# Patient Record
Sex: MALE | Race: WHITE | Employment: FULL TIME | URBAN - METROPOLITAN AREA
[De-identification: names, ages, dates, MRNs, and addresses within clinical notes are randomized per-mention and may not be internally consistent; named-entity substitution may affect disease eponyms.]

---

## 2023-08-25 PROBLEM — H90.6 MIXED CONDUCTIVE AND SENSORINEURAL HEARING LOSS OF BOTH EARS: Status: ACTIVE | Noted: 2023-08-25

## 2023-08-25 PROBLEM — M19.90 OSTEOARTHRITIS: Status: ACTIVE | Noted: 2023-08-25

## 2023-08-25 PROBLEM — I44.0 FIRST DEGREE ATRIOVENTRICULAR BLOCK: Status: ACTIVE | Noted: 2023-08-25

## 2023-08-25 PROBLEM — I25.10 ARTERIOSCLEROTIC HEART DISEASE: Status: ACTIVE | Noted: 2023-08-25

## 2023-08-25 PROBLEM — J01.90 ACUTE SINUSITIS: Status: ACTIVE | Noted: 2023-08-25

## 2023-08-25 PROBLEM — H70.11 CHRONIC MASTOIDITIS OF RIGHT SIDE: Status: ACTIVE | Noted: 2023-08-25

## 2023-08-25 PROBLEM — H93.13 BILATERAL TINNITUS: Status: ACTIVE | Noted: 2023-08-25

## 2023-08-25 PROBLEM — H61.22 IMPACTED CERUMEN OF LEFT EAR: Status: ACTIVE | Noted: 2023-08-25

## 2023-08-25 PROBLEM — M19.049 DEGENERATIVE JOINT DISEASE OF HAND: Status: ACTIVE | Noted: 2023-08-25

## 2023-08-25 PROBLEM — Z98.61 CORONARY ANGIOPLASTY STATUS: Status: ACTIVE | Noted: 2023-08-25

## 2023-08-25 PROBLEM — H74.91 DISORDER OF RIGHT MASTOID: Status: ACTIVE | Noted: 2023-08-25

## 2023-08-25 PROBLEM — M47.816 OSTEOARTHRITIS OF LUMBAR SPINE: Status: ACTIVE | Noted: 2023-08-25

## 2023-08-25 PROBLEM — H65.90 NON-SUPPURATIVE OTITIS MEDIA: Status: ACTIVE | Noted: 2023-08-25

## 2023-08-25 PROBLEM — M70.20 OLECRANON BURSITIS: Status: ACTIVE | Noted: 2023-08-25

## 2023-08-25 PROBLEM — H93.8X9 BLOCKED EAR: Status: ACTIVE | Noted: 2023-08-25

## 2023-08-25 PROBLEM — E78.5 HYPERLIPIDEMIA: Status: ACTIVE | Noted: 2023-08-25

## 2023-08-25 RX ORDER — EVOLOCUMAB 140 MG/ML
140 INJECTION, SOLUTION SUBCUTANEOUS
COMMUNITY
Start: 2021-05-20 | End: 2024-04-08

## 2023-08-25 RX ORDER — CEPHALEXIN 500 MG/1
1 CAPSULE ORAL 4 TIMES DAILY
COMMUNITY
End: 2024-04-08

## 2023-08-25 RX ORDER — SERTRALINE HYDROCHLORIDE 50 MG/1
1 TABLET, FILM COATED ORAL DAILY
COMMUNITY

## 2023-08-25 RX ORDER — NAPROXEN SODIUM 220 MG/1
2 TABLET, FILM COATED ORAL DAILY
COMMUNITY

## 2023-08-25 RX ORDER — VITAMIN E (DL,TOCOPHERYL ACET) 90 MG
1 CAPSULE ORAL DAILY
COMMUNITY

## 2023-08-25 RX ORDER — SERTRALINE HYDROCHLORIDE 100 MG/1
TABLET, FILM COATED ORAL
COMMUNITY
Start: 2021-11-05 | End: 2024-04-08

## 2023-08-25 RX ORDER — LOSARTAN POTASSIUM 25 MG/1
1 TABLET ORAL DAILY
COMMUNITY
Start: 2021-03-22

## 2023-08-25 RX ORDER — LANOLIN ALCOHOL/MO/W.PET/CERES
1 CREAM (GRAM) TOPICAL DAILY
COMMUNITY

## 2023-08-25 RX ORDER — RUTIN/HESP/BIOFLAV/C/HERBAL196 40-25-50MG
TABLET ORAL
COMMUNITY
End: 2024-04-08

## 2023-09-21 ENCOUNTER — HOSPITAL ENCOUNTER (OUTPATIENT)
Dept: DATA CONVERSION | Facility: HOSPITAL | Age: 68
Discharge: HOME | End: 2023-09-21
Payer: COMMERCIAL

## 2023-09-21 DIAGNOSIS — I10 ESSENTIAL (PRIMARY) HYPERTENSION: ICD-10-CM

## 2023-09-21 DIAGNOSIS — E78.5 HYPERLIPIDEMIA, UNSPECIFIED: ICD-10-CM

## 2023-09-21 LAB
ALBUMIN SERPL-MCNC: 4 GM/DL (ref 3.5–5)
ALBUMIN/GLOB SERPL: 1.3 RATIO (ref 1.5–3)
ALP BLD-CCNC: 79 U/L (ref 35–125)
ALT SERPL-CCNC: 34 U/L (ref 5–40)
ANION GAP SERPL CALCULATED.3IONS-SCNC: 11 MMOL/L (ref 0–19)
APPEARANCE PLAS: CLEAR
AST SERPL-CCNC: 29 U/L (ref 5–40)
BILIRUB SERPL-MCNC: 0.4 MG/DL (ref 0.1–1.2)
BUN SERPL-MCNC: 19 MG/DL (ref 8–25)
BUN/CREAT SERPL: 19 RATIO (ref 8–21)
CALCIUM SERPL-MCNC: 9.6 MG/DL (ref 8.5–10.4)
CHLORIDE SERPL-SCNC: 107 MMOL/L (ref 97–107)
CHOLEST SERPL-MCNC: 153 MG/DL (ref 133–200)
CHOLEST/HDLC SERPL: 3.6 RATIO
CO2 SERPL-SCNC: 23 MMOL/L (ref 24–31)
COLOR SPUN FLD: YELLOW
CREAT SERPL-MCNC: 1 MG/DL (ref 0.4–1.6)
FASTING STATUS PATIENT QL REPORTED: NORMAL
GFR SERPL CREATININE-BSD FRML MDRD: 81 ML/MIN/1.73 M2
GLOBULIN SER-MCNC: 3 G/DL (ref 1.9–3.7)
GLUCOSE SERPL-MCNC: 106 MG/DL (ref 65–99)
HDLC SERPL-MCNC: 42 MG/DL
LDLC SERPL CALC-MCNC: 90 MG/DL (ref 65–130)
POTASSIUM SERPL-SCNC: 4.3 MMOL/L (ref 3.4–5.1)
PROT SERPL-MCNC: 7 G/DL (ref 5.9–7.9)
SODIUM SERPL-SCNC: 140 MMOL/L (ref 133–145)
TRIGL SERPL-MCNC: 107 MG/DL (ref 40–150)

## 2023-10-23 ENCOUNTER — OFFICE VISIT (OUTPATIENT)
Dept: CARDIOLOGY | Facility: CLINIC | Age: 68
End: 2023-10-23
Payer: COMMERCIAL

## 2023-10-23 VITALS
WEIGHT: 238 LBS | OXYGEN SATURATION: 98 % | DIASTOLIC BLOOD PRESSURE: 78 MMHG | BODY MASS INDEX: 36.19 KG/M2 | HEART RATE: 73 BPM | SYSTOLIC BLOOD PRESSURE: 138 MMHG

## 2023-10-23 DIAGNOSIS — Z98.61 CORONARY ANGIOPLASTY STATUS: ICD-10-CM

## 2023-10-23 DIAGNOSIS — R07.89 OTHER CHEST PAIN: Primary | ICD-10-CM

## 2023-10-23 DIAGNOSIS — E78.00 PURE HYPERCHOLESTEROLEMIA: ICD-10-CM

## 2023-10-23 PROCEDURE — 1160F RVW MEDS BY RX/DR IN RCRD: CPT | Performed by: INTERNAL MEDICINE

## 2023-10-23 PROCEDURE — 1159F MED LIST DOCD IN RCRD: CPT | Performed by: INTERNAL MEDICINE

## 2023-10-23 PROCEDURE — 93005 ELECTROCARDIOGRAM TRACING: CPT | Performed by: INTERNAL MEDICINE

## 2023-10-23 PROCEDURE — 99214 OFFICE O/P EST MOD 30 MIN: CPT | Performed by: INTERNAL MEDICINE

## 2023-10-23 PROCEDURE — 1036F TOBACCO NON-USER: CPT | Performed by: INTERNAL MEDICINE

## 2023-10-23 PROCEDURE — 93010 ELECTROCARDIOGRAM REPORT: CPT | Performed by: INTERNAL MEDICINE

## 2023-10-23 PROCEDURE — 1126F AMNT PAIN NOTED NONE PRSNT: CPT | Performed by: INTERNAL MEDICINE

## 2023-10-23 ASSESSMENT — PATIENT HEALTH QUESTIONNAIRE - PHQ9
SUM OF ALL RESPONSES TO PHQ9 QUESTIONS 1 AND 2: 0
1. LITTLE INTEREST OR PLEASURE IN DOING THINGS: NOT AT ALL
2. FEELING DOWN, DEPRESSED OR HOPELESS: NOT AT ALL

## 2023-10-23 ASSESSMENT — ENCOUNTER SYMPTOMS
DEPRESSION: 0
LOSS OF SENSATION IN FEET: 0
OCCASIONAL FEELINGS OF UNSTEADINESS: 0

## 2023-10-23 ASSESSMENT — PAIN SCALES - GENERAL: PAINLEVEL: 0-NO PAIN

## 2023-10-23 NOTE — PROGRESS NOTES
Subjective      Chief Complaint   Patient presents with    ashd arteriosclerotic heart disease     6 month followup          Is not as active because he will get fatigued easily.  He will get some muscle discomforts in the chest and will feel the heart is pounding.  Will exertion will get a little fatigued.  He clinton us the treadmill but not as often.  He had the stents in 2011 in [facility].  Before the stents he was fatigued and was getting discomfot in the right arm and jaw.  He is not getting it now.   The legs are not swelling and no PND or orthopnea.  The arthritis in the lower back is worse.           ROS     Past Surgical History:   Procedure Laterality Date    OTHER SURGICAL HISTORY  09/15/2022    Knee replacement    OTHER SURGICAL HISTORY  09/14/2022    Mastoidectomy        Active Ambulatory Problems     Diagnosis Date Noted    Osteoarthritis of lumbar spine 08/25/2023    Osteoarthritis 08/25/2023    Olecranon bursitis 08/25/2023    Non-suppurative otitis media 08/25/2023    Mixed conductive and sensorineural hearing loss of both ears 08/25/2023    Hyperlipidemia 08/25/2023    First degree atrioventricular block 08/25/2023    Disorder of right mastoid 08/25/2023    Degenerative joint disease of hand 08/25/2023    Coronary angioplasty status 08/25/2023    Chronic mastoiditis of right side 08/25/2023    Blocked ear 08/25/2023    Bilateral tinnitus 08/25/2023    Impacted cerumen of left ear 08/25/2023    Arteriosclerotic heart disease 08/25/2023    Acute sinusitis 08/25/2023     Resolved Ambulatory Problems     Diagnosis Date Noted    No Resolved Ambulatory Problems     Past Medical History:   Diagnosis Date    Alcohol abuse     Depression     History of PTCA     Prostate cancer (CMS/[facility])         Visit Vitals  /78   Pulse 73   Wt 108 kg (238 lb)   SpO2 98%   BMI 36.19 kg/m²   Smoking Status Former   BSA 2.28 m²        Objective     Constitutional:       Appearance: Healthy appearance.   Eyes:      Pupils:  Pupils are equal, round, and reactive to light.   Neck:      Vascular: JVD normal.   Pulmonary:      Breath sounds: Normal breath sounds.   Cardiovascular:      PMI at left midclavicular line. Normal rate. Regular rhythm. Normal S1. Normal S2.       Murmurs: There is no murmur.      No gallop.  No click. No rub.   Pulses:     Intact distal pulses.   Edema:     Peripheral edema absent.   Abdominal:      Palpations: Abdomen is soft.      Tenderness: There is no abdominal tenderness.   Musculoskeletal:      Extremities: No clubbing present.Skin:     General: Skin is warm and dry.   Neurological:      General: No focal deficit present.            Lab Review:   Lab Results   Component Value Date    CHOL 153 09/21/2023    TRIG 107 09/21/2023    HDL 42 09/21/2023       Assessment/Plan     Coronary angioplasty status  He is more fatigued and has slowed down some.  The EKG shows the first degree AV block and IRBBB.  He did have some trouble with an infection in the lungs earlier in the year.  The symptoms are somewhat similar to before the PTCA and will do a stress test on him    Hyperlipidemia  Is controlled

## 2023-10-23 NOTE — ASSESSMENT & PLAN NOTE
He is more fatigued and has slowed down some.  The EKG shows the first degree AV block and IRBBB.  He did have some trouble with an infection in the lungs earlier in the year.  The symptoms are somewhat similar to before the PTCA and will do a stress test on him

## 2023-10-27 ENCOUNTER — HOSPITAL ENCOUNTER (OUTPATIENT)
Dept: CARDIOLOGY | Facility: HOSPITAL | Age: 68
Discharge: HOME | End: 2023-10-27
Payer: COMMERCIAL

## 2023-10-27 DIAGNOSIS — Z98.61 CORONARY ANGIOPLASTY STATUS: ICD-10-CM

## 2023-10-27 PROCEDURE — 93017 CV STRESS TEST TRACING ONLY: CPT

## 2023-10-27 PROCEDURE — 93018 CV STRESS TEST I&R ONLY: CPT | Performed by: INTERNAL MEDICINE

## 2023-10-27 PROCEDURE — 93016 CV STRESS TEST SUPVJ ONLY: CPT | Performed by: INTERNAL MEDICINE

## 2023-11-08 ENCOUNTER — TELEPHONE (OUTPATIENT)
Dept: CARDIOLOGY | Facility: CLINIC | Age: 68
End: 2023-11-08
Payer: COMMERCIAL

## 2023-11-14 ENCOUNTER — ANCILLARY PROCEDURE (OUTPATIENT)
Dept: RADIOLOGY | Facility: CLINIC | Age: 68
End: 2023-11-14
Payer: COMMERCIAL

## 2023-11-14 DIAGNOSIS — M54.50 LOW BACK PAIN, UNSPECIFIED: ICD-10-CM

## 2023-11-14 DIAGNOSIS — R06.09 OTHER FORMS OF DYSPNEA: ICD-10-CM

## 2023-11-14 PROCEDURE — 72110 X-RAY EXAM L-2 SPINE 4/>VWS: CPT | Mod: FY

## 2023-11-14 PROCEDURE — 71046 X-RAY EXAM CHEST 2 VIEWS: CPT

## 2024-04-07 NOTE — PROGRESS NOTES
Subjective      Chief Complaint   Patient presents with    6 month follow up          Last seen he was having some shortness of breath with exertion was question of this may be similar symptoms he had with his angioplasty.  Underwent a stress test and he was able to stress into stage IV Nigel protocol for 10 minutes with a heart rate of 129.  And no ischemic changes  Is doing well and is active.  He is not complaining of chest discomfort.  NO PND or orthopnea.  The legs are not swelling on him.  He does not complain of palpitations.  He is on a new pain med for his back  HE is on the repatha and is doing well  Is on the treadmill and has lost some wt.           Review of Systems   Constitutional: Negative. Negative for chills and fever.   HENT: Negative.     Eyes: Negative.    Respiratory: Negative.  Negative for cough.    Endocrine: Negative.    Skin: Negative.    Musculoskeletal:  Positive for joint pain. Negative for falls.   Gastrointestinal: Negative.    Genitourinary: Negative.    Neurological: Negative.    All other systems reviewed and are negative.       Past Surgical History:   Procedure Laterality Date    CORONARY ANGIOPLASTY      OTHER SURGICAL HISTORY  09/15/2022    Knee replacement    OTHER SURGICAL HISTORY  09/14/2022    Mastoidectomy        Active Ambulatory Problems     Diagnosis Date Noted    Osteoarthritis of lumbar spine 08/25/2023    Osteoarthritis 08/25/2023    Olecranon bursitis 08/25/2023    Non-suppurative otitis media 08/25/2023    Mixed conductive and sensorineural hearing loss of both ears 08/25/2023    Hyperlipidemia 08/25/2023    First degree atrioventricular block 08/25/2023    Disorder of right mastoid 08/25/2023    Degenerative joint disease of hand 08/25/2023    Coronary angioplasty status 08/25/2023    Chronic mastoiditis of right side 08/25/2023    Blocked ear 08/25/2023    Bilateral tinnitus 08/25/2023    Impacted cerumen of left ear 08/25/2023    Arteriosclerotic heart disease  "08/25/2023    Acute sinusitis 08/25/2023     Resolved Ambulatory Problems     Diagnosis Date Noted    No Resolved Ambulatory Problems     Past Medical History:   Diagnosis Date    Alcohol abuse     Depression     History of PTCA     Prostate cancer (CMS/[facility])         Visit Vitals  /81   Pulse 57   Wt 103 kg (226 lb)   SpO2 95%   BMI 34.36 kg/m²   Smoking Status Former   BSA 2.22 m²        Objective     Constitutional:       Appearance: Healthy appearance.   Eyes:      Pupils: Pupils are equal, round, and reactive to light.   Neck:      Vascular: JVD normal.   Pulmonary:      Breath sounds: Normal breath sounds.   Cardiovascular:      PMI at left midclavicular line. Normal rate. Regular rhythm. Normal S1. Normal S2.       Murmurs: There is no murmur.      No gallop.  No click. No rub.   Pulses:     Intact distal pulses.   Edema:     Peripheral edema absent.   Abdominal:      Palpations: Abdomen is soft.      Tenderness: There is no abdominal tenderness.   Musculoskeletal:      Extremities: No clubbing present.Skin:     General: Skin is warm and dry.   Neurological:      General: No focal deficit present.            Lab Review:         Lab Results   Component Value Date    CHOL 153 09/21/2023    CHOL 148 10/14/2021     Lab Results   Component Value Date    HDL 42 09/21/2023    HDL 41 10/14/2021     Lab Results   Component Value Date    LDLCALC 90 09/21/2023    LDLCALC 84 10/14/2021     Lab Results   Component Value Date    TRIG 107 09/21/2023    TRIG 117 10/14/2021     No components found for: \"CHOLHDL\"     Assessment/Plan     Arteriosclerotic heart disease  Is doing well and is active.  No angina and no chf.  The back hurts and will have PT. The stress was negative last time and neg at 10 mets.    Hyperlipidemia  Is well controlled on repatha     "

## 2024-04-08 ENCOUNTER — OFFICE VISIT (OUTPATIENT)
Dept: CARDIOLOGY | Facility: CLINIC | Age: 69
End: 2024-04-08
Payer: COMMERCIAL

## 2024-04-08 VITALS
OXYGEN SATURATION: 95 % | WEIGHT: 226 LBS | DIASTOLIC BLOOD PRESSURE: 81 MMHG | HEART RATE: 57 BPM | BODY MASS INDEX: 34.36 KG/M2 | SYSTOLIC BLOOD PRESSURE: 128 MMHG

## 2024-04-08 DIAGNOSIS — I25.10 ARTERIOSCLEROTIC HEART DISEASE: ICD-10-CM

## 2024-04-08 DIAGNOSIS — E78.00 PURE HYPERCHOLESTEROLEMIA: Primary | ICD-10-CM

## 2024-04-08 PROCEDURE — 1036F TOBACCO NON-USER: CPT | Performed by: INTERNAL MEDICINE

## 2024-04-08 PROCEDURE — 1159F MED LIST DOCD IN RCRD: CPT | Performed by: INTERNAL MEDICINE

## 2024-04-08 PROCEDURE — 1126F AMNT PAIN NOTED NONE PRSNT: CPT | Performed by: INTERNAL MEDICINE

## 2024-04-08 PROCEDURE — 99213 OFFICE O/P EST LOW 20 MIN: CPT | Performed by: INTERNAL MEDICINE

## 2024-04-08 PROCEDURE — 1160F RVW MEDS BY RX/DR IN RCRD: CPT | Performed by: INTERNAL MEDICINE

## 2024-04-08 RX ORDER — DICLOFENAC SODIUM 100 MG/1
100 TABLET, EXTENDED RELEASE ORAL DAILY
COMMUNITY
Start: 2023-12-06

## 2024-04-08 RX ORDER — FLUTICASONE FUROATE, UMECLIDINIUM BROMIDE AND VILANTEROL TRIFENATATE 200; 62.5; 25 UG/1; UG/1; UG/1
1 POWDER RESPIRATORY (INHALATION)
COMMUNITY
Start: 2023-11-27

## 2024-04-08 RX ORDER — EVOLOCUMAB 140 MG/ML
140 INJECTION, SOLUTION SUBCUTANEOUS
Qty: 2 ML | Refills: 11 | Status: SHIPPED | OUTPATIENT
Start: 2024-04-08 | End: 2025-04-08

## 2024-04-08 ASSESSMENT — PATIENT HEALTH QUESTIONNAIRE - PHQ9
1. LITTLE INTEREST OR PLEASURE IN DOING THINGS: NOT AT ALL
SUM OF ALL RESPONSES TO PHQ9 QUESTIONS 1 AND 2: 0
2. FEELING DOWN, DEPRESSED OR HOPELESS: NOT AT ALL

## 2024-04-08 ASSESSMENT — ENCOUNTER SYMPTOMS
OCCASIONAL FEELINGS OF UNSTEADINESS: 0
CHILLS: 0
DEPRESSION: 0
CONSTITUTIONAL NEGATIVE: 1
RESPIRATORY NEGATIVE: 1
GASTROINTESTINAL NEGATIVE: 1
ENDOCRINE NEGATIVE: 1
NEUROLOGICAL NEGATIVE: 1
FEVER: 0
EYES NEGATIVE: 1
LOSS OF SENSATION IN FEET: 0
FALLS: 0
COUGH: 0

## 2024-04-08 ASSESSMENT — PAIN SCALES - GENERAL: PAINLEVEL: 0-NO PAIN

## 2024-04-08 NOTE — ASSESSMENT & PLAN NOTE
Is doing well and is active.  No angina and no chf.  The back hurts and will have PT. The stress was negative last time and neg at 10 mets.

## 2024-09-10 DIAGNOSIS — I25.10 ARTERIOSCLEROTIC HEART DISEASE: Primary | ICD-10-CM

## 2024-09-12 RX ORDER — LOSARTAN POTASSIUM 25 MG/1
25 TABLET ORAL DAILY
Qty: 90 TABLET | Refills: 4 | OUTPATIENT
Start: 2024-09-12

## 2024-09-18 RX ORDER — LOSARTAN POTASSIUM 25 MG/1
25 TABLET ORAL DAILY
Qty: 90 TABLET | Refills: 3 | Status: SHIPPED | OUTPATIENT
Start: 2024-09-18

## 2024-10-14 ENCOUNTER — APPOINTMENT (OUTPATIENT)
Dept: CARDIOLOGY | Facility: CLINIC | Age: 69
End: 2024-10-14
Payer: COMMERCIAL

## 2024-10-22 NOTE — PROGRESS NOTES
Subjective      Chief Complaint   Patient presents with    Follow-up          Last seen he was having some shortness of breath with exertion was question of this may be similar symptoms he had with his angioplasty.  Underwent a stress test and he was able to stress into stage IV Nigel protocol for 10 minutes with a heart rate of 129.  And no ischemic changes  He is active.  He is not complaining of chest discomfort.  NO PND or orthopnea.  The legs are not swelling on him.  He does not complain of palpitations.  The BP is doing well  The chol has been good           ROS     Past Surgical History:   Procedure Laterality Date    CORONARY ANGIOPLASTY      OTHER SURGICAL HISTORY  09/15/2022    Knee replacement    OTHER SURGICAL HISTORY  09/14/2022    Mastoidectomy        Active Ambulatory Problems     Diagnosis Date Noted    Osteoarthritis of lumbar spine 08/25/2023    Osteoarthritis 08/25/2023    Olecranon bursitis 08/25/2023    Non-suppurative otitis media 08/25/2023    Mixed conductive and sensorineural hearing loss of both ears 08/25/2023    Hyperlipidemia 08/25/2023    First degree atrioventricular block 08/25/2023    Disorder of right mastoid 08/25/2023    Degenerative joint disease of hand 08/25/2023    Coronary angioplasty status 08/25/2023    Chronic mastoiditis of right side 08/25/2023    Blocked ear 08/25/2023    Bilateral tinnitus 08/25/2023    Impacted cerumen of left ear 08/25/2023    Arteriosclerotic heart disease 08/25/2023    Acute sinusitis 08/25/2023     Resolved Ambulatory Problems     Diagnosis Date Noted    No Resolved Ambulatory Problems     Past Medical History:   Diagnosis Date    Alcohol abuse     Depression     History of PTCA     Prostate cancer ([facility])         Visit Vitals  /82   Pulse 60   Wt 101 kg (223 lb)   SpO2 98%   BMI 33.91 kg/m²   Smoking Status Former   BSA 2.2 m²        Objective     Constitutional:       Appearance: Healthy appearance.   Eyes:      Pupils: Pupils are equal,  "round, and reactive to light.   Neck:      Vascular: No JVR. JVD normal.   Pulmonary:      Effort: Pulmonary effort is normal.      Breath sounds: Normal breath sounds.   Cardiovascular:      PMI at left midclavicular line. Normal rate. Regular rhythm. Normal S1. Normal S2.       Murmurs: There is no murmur.      No gallop.  No click. No rub.   Pulses:     Intact distal pulses.   Edema:     Peripheral edema absent.   Abdominal:      Palpations: Abdomen is soft.      Tenderness: There is no abdominal tenderness.   Musculoskeletal: Normal range of motion.      Extremities: No clubbing present.Skin:     General: Skin is warm and dry.   Neurological:      General: No focal deficit present.            Lab Review:         Lab Results   Component Value Date    CHOL 153 09/21/2023    CHOL 148 10/14/2021     Lab Results   Component Value Date    HDL 42 09/21/2023    HDL 41 10/14/2021     Lab Results   Component Value Date    LDLCALC 90 09/21/2023    LDLCALC 84 10/14/2021     Lab Results   Component Value Date    TRIG 107 09/21/2023    TRIG 117 10/14/2021     No components found for: \"CHOLHDL\"     Assessment/Plan     Arteriosclerotic heart disease  Is doing well and no angina and no chf.  He remains active    Hyperlipidemia  Has been well maintained     "

## 2024-10-23 ENCOUNTER — OFFICE VISIT (OUTPATIENT)
Dept: CARDIOLOGY | Facility: CLINIC | Age: 69
End: 2024-10-23
Payer: COMMERCIAL

## 2024-10-23 VITALS
SYSTOLIC BLOOD PRESSURE: 140 MMHG | OXYGEN SATURATION: 98 % | HEART RATE: 60 BPM | BODY MASS INDEX: 33.91 KG/M2 | WEIGHT: 223 LBS | DIASTOLIC BLOOD PRESSURE: 82 MMHG

## 2024-10-23 DIAGNOSIS — E78.00 PURE HYPERCHOLESTEROLEMIA: ICD-10-CM

## 2024-10-23 DIAGNOSIS — I25.10 ARTERIOSCLEROTIC HEART DISEASE: Primary | ICD-10-CM

## 2024-10-23 PROCEDURE — 99213 OFFICE O/P EST LOW 20 MIN: CPT | Performed by: INTERNAL MEDICINE

## 2024-10-23 PROCEDURE — 1126F AMNT PAIN NOTED NONE PRSNT: CPT | Performed by: INTERNAL MEDICINE

## 2024-10-23 PROCEDURE — 1036F TOBACCO NON-USER: CPT | Performed by: INTERNAL MEDICINE

## 2024-10-23 ASSESSMENT — ENCOUNTER SYMPTOMS
OCCASIONAL FEELINGS OF UNSTEADINESS: 0
DEPRESSION: 0
LOSS OF SENSATION IN FEET: 0

## 2024-10-23 ASSESSMENT — PAIN SCALES - GENERAL: PAINLEVEL_OUTOF10: 0-NO PAIN

## 2024-11-13 ENCOUNTER — TELEPHONE (OUTPATIENT)
Dept: CARDIOLOGY | Facility: CLINIC | Age: 69
End: 2024-11-13
Payer: COMMERCIAL

## 2024-12-23 ENCOUNTER — HOSPITAL ENCOUNTER (OUTPATIENT)
Dept: RADIOLOGY | Facility: HOSPITAL | Age: 69
Discharge: HOME | End: 2024-12-23
Payer: COMMERCIAL

## 2024-12-23 DIAGNOSIS — R52 PAIN: ICD-10-CM

## 2024-12-23 DIAGNOSIS — M19.011 PRIMARY OSTEOARTHRITIS, RIGHT SHOULDER: ICD-10-CM

## 2024-12-23 PROCEDURE — 76376 3D RENDER W/INTRP POSTPROCES: CPT | Mod: RIGHT SIDE | Performed by: RADIOLOGY

## 2024-12-23 PROCEDURE — 73200 CT UPPER EXTREMITY W/O DYE: CPT | Mod: RIGHT SIDE | Performed by: RADIOLOGY

## 2024-12-23 PROCEDURE — 76377 3D RENDER W/INTRP POSTPROCES: CPT

## 2024-12-23 PROCEDURE — 73200 CT UPPER EXTREMITY W/O DYE: CPT | Mod: RT

## 2025-02-02 NOTE — PROGRESS NOTES
Subjective      Chief Complaint   Patient presents with    S/C 02/20/2025 Dr. Cruz  RIGHT REVERSE TOTAL SHOULDER            He is to have the right shoulder replaced.  He is not complaining of chest discomfort.  NO PND or orthopnea.  The legs are not swelling on him.  He does not complain of palpitations. He had the stress test 10/2023 and was negative at 10METS.  He has not trouble with walking staires  The EKG shows NSR with NSTTW changes unchanged from 2023           Review of Systems   Constitutional: Negative. Negative for chills.   HENT: Negative.     Eyes: Negative.    Respiratory: Negative.  Negative for cough and shortness of breath.    Endocrine: Negative.    Skin: Negative.    Musculoskeletal: Negative.  Negative for falls.   Gastrointestinal: Negative.    Genitourinary: Negative.    Neurological: Negative.    All other systems reviewed and are negative.       Past Surgical History:   Procedure Laterality Date    CORONARY ANGIOPLASTY      OTHER SURGICAL HISTORY  09/15/2022    Knee replacement    OTHER SURGICAL HISTORY  09/14/2022    Mastoidectomy        Active Ambulatory Problems     Diagnosis Date Noted    Osteoarthritis of lumbar spine 08/25/2023    Osteoarthritis 08/25/2023    Olecranon bursitis 08/25/2023    Non-suppurative otitis media 08/25/2023    Mixed conductive and sensorineural hearing loss of both ears 08/25/2023    Hyperlipidemia 08/25/2023    First degree atrioventricular block 08/25/2023    Disorder of right mastoid 08/25/2023    Degenerative joint disease of hand 08/25/2023    Coronary angioplasty status 08/25/2023    Chronic mastoiditis of right side 08/25/2023    Blocked ear 08/25/2023    Bilateral tinnitus 08/25/2023    Impacted cerumen of left ear 08/25/2023    Arteriosclerotic heart disease 08/25/2023    Acute sinusitis 08/25/2023    Preop cardiovascular exam 02/03/2025     Resolved Ambulatory Problems     Diagnosis Date Noted    No Resolved Ambulatory Problems     Past Medical  "History:   Diagnosis Date    Alcohol abuse     Depression     History of PTCA     Prostate cancer (Multi)         Visit Vitals  /70   Pulse 65   Wt 102 kg (224 lb)   SpO2 96%   BMI 34.06 kg/m²   Smoking Status Former   BSA 2.21 m²        Objective     Constitutional:       Appearance: Healthy appearance.   Eyes:      Pupils: Pupils are equal, round, and reactive to light.   Neck:      Vascular: No JVR. JVD normal.   Pulmonary:      Effort: Pulmonary effort is normal.      Breath sounds: Normal breath sounds.   Cardiovascular:      PMI at left midclavicular line. Normal rate. Regular rhythm. Normal S1. Normal S2.       Murmurs: There is no murmur.      No gallop.  No click. No rub.   Pulses:     Intact distal pulses.   Edema:     Peripheral edema absent.   Abdominal:      Palpations: Abdomen is soft.      Tenderness: There is no abdominal tenderness.   Musculoskeletal: Normal range of motion.      Extremities: No clubbing present.Skin:     General: Skin is warm and dry.   Neurological:      General: No focal deficit present.            Lab Review:         Lab Results   Component Value Date    CHOL 153 09/21/2023    CHOL 148 10/14/2021     Lab Results   Component Value Date    HDL 42 09/21/2023    HDL 41 10/14/2021     Lab Results   Component Value Date    LDLCALC 90 09/21/2023    LDLCALC 84 10/14/2021     Lab Results   Component Value Date    TRIG 107 09/21/2023    TRIG 117 10/14/2021     No components found for: \"CHOLHDL\"     Assessment/Plan     Preop cardiovascular exam  He is doing well and no angina and no chf.    He is to have the right shoulder surgery and feel is low risk and will clear for surgery.     "

## 2025-02-03 ENCOUNTER — OFFICE VISIT (OUTPATIENT)
Dept: CARDIOLOGY | Facility: CLINIC | Age: 70
End: 2025-02-03
Payer: COMMERCIAL

## 2025-02-03 ENCOUNTER — PRE-ADMISSION TESTING (OUTPATIENT)
Dept: PREADMISSION TESTING | Facility: HOSPITAL | Age: 70
End: 2025-02-03
Payer: COMMERCIAL

## 2025-02-03 VITALS
DIASTOLIC BLOOD PRESSURE: 70 MMHG | OXYGEN SATURATION: 96 % | HEART RATE: 65 BPM | BODY MASS INDEX: 34.06 KG/M2 | WEIGHT: 224 LBS | SYSTOLIC BLOOD PRESSURE: 120 MMHG

## 2025-02-03 VITALS
WEIGHT: 224.1 LBS | HEIGHT: 68 IN | TEMPERATURE: 98.6 F | SYSTOLIC BLOOD PRESSURE: 142 MMHG | OXYGEN SATURATION: 97 % | HEART RATE: 60 BPM | DIASTOLIC BLOOD PRESSURE: 75 MMHG | BODY MASS INDEX: 33.96 KG/M2

## 2025-02-03 DIAGNOSIS — Z01.818 PREOPERATIVE TESTING: Primary | ICD-10-CM

## 2025-02-03 DIAGNOSIS — I25.10 ARTERIOSCLEROTIC HEART DISEASE: Primary | ICD-10-CM

## 2025-02-03 DIAGNOSIS — Z01.810 PREOP CARDIOVASCULAR EXAM: ICD-10-CM

## 2025-02-03 PROCEDURE — 87081 CULTURE SCREEN ONLY: CPT | Mod: WESLAB

## 2025-02-03 PROCEDURE — 93010 ELECTROCARDIOGRAM REPORT: CPT | Performed by: INTERNAL MEDICINE

## 2025-02-03 PROCEDURE — 1159F MED LIST DOCD IN RCRD: CPT | Performed by: INTERNAL MEDICINE

## 2025-02-03 PROCEDURE — 1126F AMNT PAIN NOTED NONE PRSNT: CPT | Performed by: INTERNAL MEDICINE

## 2025-02-03 PROCEDURE — 1036F TOBACCO NON-USER: CPT | Performed by: INTERNAL MEDICINE

## 2025-02-03 PROCEDURE — 99213 OFFICE O/P EST LOW 20 MIN: CPT | Performed by: INTERNAL MEDICINE

## 2025-02-03 PROCEDURE — 99204 OFFICE O/P NEW MOD 45 MIN: CPT | Performed by: NURSE PRACTITIONER

## 2025-02-03 PROCEDURE — 93005 ELECTROCARDIOGRAM TRACING: CPT | Performed by: INTERNAL MEDICINE

## 2025-02-03 PROCEDURE — 99213 OFFICE O/P EST LOW 20 MIN: CPT | Mod: 25 | Performed by: INTERNAL MEDICINE

## 2025-02-03 RX ORDER — ACETAMINOPHEN 500 MG
1000 TABLET ORAL EVERY 6 HOURS PRN
COMMUNITY

## 2025-02-03 RX ORDER — CHLORHEXIDINE GLUCONATE ORAL RINSE 1.2 MG/ML
SOLUTION DENTAL
Qty: 473 ML | Refills: 0 | Status: SHIPPED | OUTPATIENT
Start: 2025-02-03

## 2025-02-03 ASSESSMENT — DUKE ACTIVITY SCORE INDEX (DASI)
CAN YOU TAKE CARE OF YOURSELF (EAT, DRESS, BATHE, OR USE TOILET): YES
CAN YOU DO MODERATE WORK AROUND THE HOUSE LIKE VACUUMING, SWEEPING FLOORS OR CARRYING GROCERIES: YES
CAN YOU DO HEAVY WORK AROUND THE HOUSE LIKE SCRUBBING FLOORS OR LIFTING AND MOVING HEAVY FURNITURE: YES
CAN YOU RUN A SHORT DISTANCE: YES
CAN YOU CLIMB A FLIGHT OF STAIRS OR WALK UP A HILL: YES
TOTAL_SCORE: 58.2
CAN YOU PARTICIPATE IN STRENOUS SPORTS LIKE SWIMMING, SINGLES TENNIS, FOOTBALL, BASKETBALL, OR SKIING: YES
CAN YOU PARTICIPATE IN MODERATE RECREATIONAL ACTIVITIES LIKE GOLF, BOWLING, DANCING, DOUBLES TENNIS OR THROWING A BASEBALL OR FOOTBALL: YES
CAN YOU DO LIGHT WORK AROUND THE HOUSE LIKE DUSTING OR WASHING DISHES: YES
CAN YOU HAVE SEXUAL RELATIONS: YES
CAN YOU DO YARD WORK LIKE RAKING LEAVES, WEEDING OR PUSHING A MOWER: YES
DASI METS SCORE: 9.9
CAN YOU WALK INDOORS, SUCH AS AROUND YOUR HOUSE: YES
CAN YOU WALK A BLOCK OR TWO ON LEVEL GROUND: YES

## 2025-02-03 ASSESSMENT — ENCOUNTER SYMPTOMS
NEUROLOGICAL NEGATIVE: 1
SHORTNESS OF BREATH: 0
GASTROINTESTINAL NEGATIVE: 1
CONSTITUTIONAL NEGATIVE: 1
EYES NEGATIVE: 1
ARTHRALGIAS: 1
MUSCULOSKELETAL NEGATIVE: 1
GASTROINTESTINAL NEGATIVE: 1
NEUROLOGICAL NEGATIVE: 1
RESPIRATORY NEGATIVE: 1
EYES NEGATIVE: 1
ACTIVITY CHANGE: 1
PSYCHIATRIC NEGATIVE: 1
RESPIRATORY NEGATIVE: 1
LOSS OF SENSATION IN FEET: 0
DEPRESSION: 0
COUGH: 0
CARDIOVASCULAR NEGATIVE: 1
ENDOCRINE NEGATIVE: 1
CHILLS: 0
OCCASIONAL FEELINGS OF UNSTEADINESS: 0
FALLS: 0

## 2025-02-03 ASSESSMENT — PATIENT HEALTH QUESTIONNAIRE - PHQ9
1. LITTLE INTEREST OR PLEASURE IN DOING THINGS: NOT AT ALL
2. FEELING DOWN, DEPRESSED OR HOPELESS: NOT AT ALL
SUM OF ALL RESPONSES TO PHQ9 QUESTIONS 1 AND 2: 0

## 2025-02-03 ASSESSMENT — LIFESTYLE VARIABLES: TOTAL SCORE: 0

## 2025-02-03 ASSESSMENT — PAIN SCALES - GENERAL
PAINLEVEL_OUTOF10: 0-NO PAIN
PAINLEVEL_OUTOF10: 0 - NO PAIN

## 2025-02-03 ASSESSMENT — PAIN - FUNCTIONAL ASSESSMENT: PAIN_FUNCTIONAL_ASSESSMENT: 0-10

## 2025-02-03 NOTE — PREPROCEDURE INSTRUCTIONS
Medication List            Accurate as of February 3, 2025  2:49 PM. Always use your most recent med list.                acetaminophen 500 mg tablet  Commonly known as: Tylenol  Medication Adjustments for Surgery: Take/Use as prescribed     aspirin 81 mg chewable tablet  Additional Medication Adjustments for Surgery: Take last dose 7 days before surgery     losartan 25 mg tablet  Commonly known as: Cozaar  Take 1 tablet (25 mg) by mouth once daily.  Medication Adjustments for Surgery: Do Not take on the morning of surgery     MULTI VITAMIN ORAL  Additional Medication Adjustments for Surgery: Take last dose 7 days before surgery     Repatha SureClick 140 mg/mL injection  Generic drug: evolocumab  Inject 1 mL (140 mg) under the skin every 14 (fourteen) days.     Trelegy Ellipta 200-62.5-25 mcg blister with device  Generic drug: fluticasone-umeclidin-vilanter  Medication Adjustments for Surgery: Take on the morning of surgery     Vitamin B-12 1,000 mcg tablet  Generic drug: cyanocobalamin  Additional Medication Adjustments for Surgery: Take last dose 7 days before surgery     Zoloft 50 mg tablet  Generic drug: sertraline  Medication Adjustments for Surgery: Take on the morning of surgery                    Preoperative Fasting Guidelines    Why must I stop eating and drinking near surgery time?  With sedation, food or liquid in your stomach can enter your lungs causing serious complications  Increases nausea and vomiting    When do I need to stop eating and drinking before my surgery?  Do not eat any food after midnight the night before your surgery/procedure.  You may have up to 13.5 ounces of clear liquid until TWO hours before your instructed arrival time to the hospital.  This includes water, black tea/coffee, (no milk or cream) apple juice, and electrolyte drinks (Gatorade)  You may chew gum until TWO hours before your surgery/procedure    PAT DISCHARGE INSTRUCTIONS    Please call the Same Day Surgery (SDS)  Department of the hospital where your procedure will be performed after 2:00 PM the day before your surgery. If you are scheduled on a Monday, or a Tuesday following a Monday holiday, you will need to call on the last business day prior to your surgery.    [facility]  [address] [place] [address] [phone]  [facility]  [address], [address] [phone]  [facility]  [address]  [place], [place] [id number] [phone]    Please let your surgeon know if:      You develop any open sores, shingles, burning or painful urination as these may increase your risk of an infection.   You no longer wish to have the surgery.   Any other personal circumstances change that may lead to the need to cancel or defer this surgery-such as being sick or getting admitted to any hospital within one week of your planned procedure.    Your contact details change, such as a change of address or phone number.    Starting now:     Please DO NOT drink alcohol or smoke for 24 hours before surgery. It is well known that quitting smoking can make a huge difference to your health and recovery from surgery. The longer you abstain from smoking, the better your chances of a healthy recovery. If you need help with quitting, call 7-800-QUIT-NOW to be connected to a trained counselor who will discuss the best methods to help you quit.     Before your surgery:    Please stop all supplements 7 days prior to surgery. Or as directed by your surgeon.   Please stop taking NSAID pain medicine such as Advil and Motrin 7 days before surgery.    If you develop any fever, cough, cold, rashes, cuts, scratches, scrapes, urinary symptoms or infection anywhere on your body (including teeth and gums) prior to surgery, please call your surgeon’s office as soon as possible. This may require treatment  to reduce the chance of cancellation on the day of surgery.    The day before your surgery:   DIET- Please follow the diet instructions at the top of your packet.   Get a good night’s rest.  Use the special soap for bathing if you have been instructed to use one.    Scheduled surgery times may change and you will be notified if this occurs - please check your personal voicemail for any updates.     On the morning of surgery:   Wear comfortable, loose fitting clothes which open in the front. Please do not wear moisturizers, creams, lotions, makeup or perfume.    Please bring with you to surgery:   Photo ID and insurance card   Current list of medicines and allergies   Pacemaker/ Defibrillator/Heart stent cards   CPAP machine and mask    Slings/ splints/ crutches   A copy of your complete advanced directive/DHPOA.    Please do NOT bring with you to surgery:   All jewelry and valuables should be left at home.   Prosthetic devices such as contact lenses, hearing aids, dentures, eyelash extensions, hairpins and body piercings must be removed prior to going in to the surgical suite.    After outpatient surgery:   A responsible adult MUST accompany you at the time of discharge and stay with you for 24 hours after your surgery. You may NOT drive yourself home after surgery.    Do not drive, operate machinery, make critical decisions or do activities that require co-ordination or balance until after a night’s sleep.   Do not drink alcoholic beverages for 24 hours.   Instructions for resuming your medications will be provided by your surgeon.    CALL YOUR DOCTOR AFTER SURGERY IF YOU HAVE:     Chills and/or a fever of 101° F or higher.    Redness, swelling, pus or drainage from your surgical wound or a bad smell from the wound.    Lightheadedness, fainting or confusion.    Persistent vomiting (throwing up) and are not able to eat or drink for 12 hours.    Three or more loose, watery bowel movements in 24 hours (diarrhea).    Difficulty or pain while urinating( after non-urological surgery)    Pain and swelling in your legs, especially if it is only on one side.    Difficulty breathing or are breathing faster than normal.    Any new concerning symptoms.        Patient Information: Pre-Operative Infection Prevention Measures     Why did I have my nose, under my arms, and groin swabbed?  The purpose of the swab is to identify Staphylococcus aureus inside your nose or on your skin.  The swab was sent to the laboratory for culture.  A positive swab/culture for Staphylococcus aureus is called colonization or carriage.      What is Staphylococcus aureus?  Staphylococcus aureus, also known as “staph”, is a germ found on the skin or in the nose of healthy people.  Sometimes Staphylococcus aureus can get into the body and cause an infection.  This can be minor (such as pimples, boils, or other skin problems).  It might also be serious (such as a blood infection, pneumonia, or a surgical site infection).    What is Staphylococcus aureus colonization or carriage?  Colonization or carriage means that a person has the germ but is not sick from it.  These bacteria can be spread on the hands or when breathing or sneezing.    How is Staphylococcus aureus spread?  It is most often spread by close contact with a person or item that carries it.    What happens if my culture is positive for Staphylococcus aureus?  Your doctor/medical team will use this information to guide any antibiotic treatment which may be necessary.  Regardless of the culture results, we will clean the inside of your nose with a betadine swab just before you have your surgery.      Will I get an infection if I have Staphylococcus aureus in my nose or on my skin?  Anyone can get an infection with Staphylococcus aureus.  However, the best way to reduce your risk of infection is to follow the instructions provided to you for the use of your CHG soap and dental rinse.        Patient  Information: Oral/Dental Rinse    What is oral/dental rinse?   It is a mouthwash. It is a way of cleaning the mouth with a germ-killing solution before your surgery.  The solution contains chlorhexidine, commonly known as CHG.   It is used inside the mouth to kill a bacteria known as Staphylococcus aureus.  Let your doctor know if you are allergic to Chlorhexidine.    Why do I need to use CHG oral/dental rinse?  The CHG oral/dental rinse helps to kill a bacteria in your mouth known as Staphylococcus aureus.     This reduces the risk of infection at the surgical site.      Using your CHG oral/dental rinse  STEPS:  Use your CHG oral/dental rinse after you brush your teeth the night before (at bedtime) and the morning of your surgery.  Follow all directions on your prescription label.    Use the cap on the container to measure 15ml   Swish (gargle if you can) the mouthwash in your mouth for at least 30 seconds, (do not swallow) and spit out  After you use your CHG rinse, do not rinse your mouth with water, drink or eat.  Please refer to the prescription label for the appropriate time to resume oral intake      What side effects might I have using the CHG oral/dental rinse?  CHG rinse will stick to plaque on the teeth.  Brush and floss just before use.  Teeth brushing will help avoid staining of plaque during use.      Patient Information: Home Preoperative Antibacterial Shower      What is a home preoperative antibacterial shower?  This shower is a way of cleaning the skin with a germ-killing solution before surgery.  The solution contains chlorhexidine, commonly known as CHG.  CHG is a skin cleanser with germ-killing ability.  Let your doctor know if you are allergic to chlorhexidine.    Why do I need to take a preoperative antibacterial shower?  Skin is not sterile.  It is best to try to make your skin as free of germs as possible before surgery.  Proper cleansing with a germ-killing soap before surgery can lower the  number of germs on your skin.  This helps to reduce the risk of infection at the surgical site.  Following the instructions listed below will help you prepare your skin for surgery.      How do I use the solution?  Steps:  Begin using your CHG soap 5 days before your scheduled surgery on ___2/20/25 -- start wash 2/16/25_______.    First, wash and rinse your hair using the CHG soap. Keep CHG soap away from ear canals and eyes.  Rinse completely, do not condition.  Hair extensions should be removed.  Wash your face with your normal soap and rinse.    Apply the CHG solution to a clean wet washcloth.  Turn the water off or move away from the water spray to avoid premature rinsing of the CHG soap as you are applying.   Firmly lather your entire body from the neck down.  Do not use on your face.  Pay special attention to the area(s) where your incision(s) will be located unless they are on your face.  Avoid scrubbing your skin too hard.  The important point is to have the CHG soap sit on your skin for 3 minutes.    When the 3 minutes are up, turn on the water and rinse the CHG solution off your body completely.   DO NOT wash with regular soap after you have used the CHG soap solution  Pat yourself dry with a clean, freshly-laundered towel.  DO NOT apply powders, deodorants, or lotions.  Dress in clean, freshly laundered nightclothes.    Be sure to sleep with clean, freshly laundered sheets.  Be aware that CHG will cause stains on fabrics; if you wash them with bleach after use.  Rinse your washcloth and other linens that have contact with CHG completely.  Use only non-chlorine detergents to launder the items used.   The morning of surgery is the fifth day.  Repeat the above steps and dress in clean comfortable clothing     Whom should I contact if I have any questions regarding the use of CHG soap?  Call the [facility] at [phone] if you have any  questions.

## 2025-02-03 NOTE — ASSESSMENT & PLAN NOTE
He is doing well and no angina and no chf.    He is to have the right shoulder surgery and feel is low risk and will clear for surgery.

## 2025-02-03 NOTE — H&P (VIEW-ONLY)
CPM/PAT Evaluation       Name: Galo Sanchez (Galo Sanchez)  /Age: 1955/69 y.o.     In-Person       Chief Complaint: Right shoulder arthritis    HPI  A 69-year-old male with right shoulder arthritis.  History of gradually progressive right shoulder pain and decreased range of motion that became worse over the past year after falling in his yard 2024. Symptoms increased with reaching or lifting motions interfering with ADLs.  Conservative treatments not helping.  Endorses some right hand tingling.  Denies fever, chills, chest pain, shortness of breath, syncope, or right upper extremity numbness.  He is scheduled for right reverse total shoulder arthroplasty.      Past Medical History:   Diagnosis Date    Alcohol abuse     Depression     History of PTCA     Hyperlipidemia     Prostate cancer (Multi)        Past Surgical History:   Procedure Laterality Date    CORONARY ANGIOPLASTY      OTHER SURGICAL HISTORY  09/15/2022    Knee replacement    OTHER SURGICAL HISTORY  2022    Mastoidectomy         Allergies   Allergen Reactions    Statins-Hmg-Coa Reductase Inhibitors Other       Current Outpatient Medications   Medication Sig Dispense Refill    acetaminophen (Tylenol) 500 mg tablet Take 2 tablets (1,000 mg) by mouth every 6 hours if needed for mild pain (1 - 3).      aspirin 81 mg chewable tablet Chew 2 tablets (162 mg) once daily.      cyanocobalamin (Vitamin B-12) 1,000 mcg tablet Take 1 tablet (1,000 mcg) by mouth once daily.      evolocumab (Repatha SureClick) 140 mg/mL injection Inject 1 mL (140 mg) under the skin every 14 (fourteen) days. 2 mL 11    losartan (Cozaar) 25 mg tablet Take 1 tablet (25 mg) by mouth once daily. 90 tablet 3    multivit-min/ferrous fumarate (MULTI VITAMIN ORAL) Take 1 tablet by mouth once daily.      sertraline (Zoloft) 50 mg tablet Take 1 tablet (50 mg) by mouth once daily.      Trelegy Ellipta 200-62.5-25 mcg blister with device 1 puff.      chlorhexidine (Peridex)  "0.12 % solution Use cap to measure 15 mL.  Swish/gargle mouthwash for at least 30 seconds.  Do not swallow.  Use night before surgery after brushing teeth and morning of surgery after brushing teeth. 473 mL 0     No current facility-administered medications for this visit.          Review of Systems   Constitutional:  Positive for activity change.   HENT: Negative.     Eyes: Negative.         Glasses   Respiratory: Negative.     Cardiovascular: Negative.    Gastrointestinal: Negative.    Genitourinary: Negative.    Musculoskeletal:  Positive for arthralgias.        Right shoulder pain, decreased range of motion   Skin: Negative.    Neurological: Negative.    Psychiatric/Behavioral: Negative.          Physical Exam  Vitals reviewed.   HENT:      Head: Normocephalic and atraumatic.      Ears:      Comments: Hearing aids     Mouth/Throat:      Mouth: Mucous membranes are moist.   Eyes:      Pupils: Pupils are equal, round, and reactive to light.      Comments: Glasses   Cardiovascular:      Rate and Rhythm: Normal rate and regular rhythm.   Pulmonary:      Effort: Pulmonary effort is normal.      Breath sounds: Normal breath sounds.   Abdominal:      Palpations: Abdomen is soft.   Musculoskeletal:      Cervical back: Normal range of motion.      Comments: Decreased range of motion right shoulder   Skin:     General: Skin is warm and dry.   Neurological:      Mental Status: He is alert and oriented to person, place, and time.   Psychiatric:         Mood and Affect: Mood normal.          PAT AIRWAY:   Airway:     Mallampati::  II    Neck ROM::  Full  normal     partials      /75   Pulse 60   Temp 37 °C (98.6 °F) (Temporal)   Ht 1.727 m (5' 8\")   Wt 102 kg (224 lb 1.6 oz)   SpO2 97%   BMI 34.07 kg/m²         Stop Bang Score 2   CHADS: 2.8%  DASI risk score: 58.2  METS: 9.9  CE: 0.09%  RCRI: 0.4%  ASA: II  ARISCAT: 1.6% score 19  EKG done 2/3/2025  Clearance done with Dr. Rebolledo 2/3/2025  PAT orders CBC, BMP, " MRSA        Assessment and Plan:     Right shoulder arthritis Plan: Right reverse total shoulder arthroplasty  CAD status post PTCA times 2/2011 follows with Dr. Rebolledo-taking aspirin instructed by RN to stop 5 days prior to procedure  Hyperlipidemia managed with Repatha injection  Hypertension managed with losartan  Anxiety/depression taking sertraline  Prostate cancer status post surgical intervention, radiation  Asthma using Trelegy  BMI 34.07

## 2025-02-05 LAB — STAPHYLOCOCCUS SPEC CULT: ABNORMAL

## 2025-02-06 ENCOUNTER — LAB (OUTPATIENT)
Dept: LAB | Facility: HOSPITAL | Age: 70
End: 2025-02-06
Payer: COMMERCIAL

## 2025-02-06 DIAGNOSIS — Z01.818 ENCOUNTER FOR OTHER PREPROCEDURAL EXAMINATION: Primary | ICD-10-CM

## 2025-02-06 LAB
ANION GAP SERPL CALCULATED.3IONS-SCNC: 11 MMOL/L (ref 10–20)
BUN SERPL-MCNC: 24 MG/DL (ref 6–23)
CALCIUM SERPL-MCNC: 9.7 MG/DL (ref 8.6–10.3)
CHLORIDE SERPL-SCNC: 104 MMOL/L (ref 98–107)
CO2 SERPL-SCNC: 27 MMOL/L (ref 21–32)
CREAT SERPL-MCNC: 0.94 MG/DL (ref 0.5–1.3)
EGFRCR SERPLBLD CKD-EPI 2021: 87 ML/MIN/1.73M*2
ERYTHROCYTE [DISTWIDTH] IN BLOOD BY AUTOMATED COUNT: 14.6 % (ref 11.5–14.5)
GLUCOSE SERPL-MCNC: 94 MG/DL (ref 74–99)
HCT VFR BLD AUTO: 45.2 % (ref 41–52)
HGB BLD-MCNC: 15.5 G/DL (ref 13.5–17.5)
MCH RBC QN AUTO: 31.6 PG (ref 26–34)
MCHC RBC AUTO-ENTMCNC: 34.3 G/DL (ref 32–36)
MCV RBC AUTO: 92 FL (ref 80–100)
NRBC BLD-RTO: 0 /100 WBCS (ref 0–0)
PLATELET # BLD AUTO: 317 X10*3/UL (ref 150–450)
POTASSIUM SERPL-SCNC: 4.3 MMOL/L (ref 3.5–5.3)
RBC # BLD AUTO: 4.9 X10*6/UL (ref 4.5–5.9)
SODIUM SERPL-SCNC: 138 MMOL/L (ref 136–145)
WBC # BLD AUTO: 6.7 X10*3/UL (ref 4.4–11.3)

## 2025-02-06 PROCEDURE — 80048 BASIC METABOLIC PNL TOTAL CA: CPT

## 2025-02-06 PROCEDURE — 85027 COMPLETE CBC AUTOMATED: CPT

## 2025-02-19 ENCOUNTER — ANESTHESIA EVENT (OUTPATIENT)
Dept: OPERATING ROOM | Facility: HOSPITAL | Age: 70
End: 2025-02-19
Payer: COMMERCIAL

## 2025-02-20 ENCOUNTER — ANESTHESIA (OUTPATIENT)
Dept: OPERATING ROOM | Facility: HOSPITAL | Age: 70
End: 2025-02-20
Payer: COMMERCIAL

## 2025-02-20 ENCOUNTER — HOSPITAL ENCOUNTER (OUTPATIENT)
Facility: HOSPITAL | Age: 70
Setting detail: OUTPATIENT SURGERY
Discharge: HOME | End: 2025-02-20
Attending: ORTHOPAEDIC SURGERY | Admitting: ORTHOPAEDIC SURGERY
Payer: COMMERCIAL

## 2025-02-20 ENCOUNTER — APPOINTMENT (OUTPATIENT)
Dept: RADIOLOGY | Facility: HOSPITAL | Age: 70
End: 2025-02-20
Payer: COMMERCIAL

## 2025-02-20 VITALS
WEIGHT: 221.34 LBS | RESPIRATION RATE: 14 BRPM | DIASTOLIC BLOOD PRESSURE: 74 MMHG | SYSTOLIC BLOOD PRESSURE: 123 MMHG | BODY MASS INDEX: 33.55 KG/M2 | OXYGEN SATURATION: 93 % | TEMPERATURE: 97.5 F | HEART RATE: 63 BPM | HEIGHT: 68 IN

## 2025-02-20 DIAGNOSIS — M19.011 ARTHRITIS OF RIGHT SHOULDER REGION: Primary | ICD-10-CM

## 2025-02-20 PROBLEM — I10 HTN (HYPERTENSION): Status: ACTIVE | Noted: 2025-02-20

## 2025-02-20 PROBLEM — C61 PROSTATE CANCER (MULTI): Status: ACTIVE | Noted: 2025-02-20

## 2025-02-20 PROBLEM — J45.909 ASTHMA: Status: ACTIVE | Noted: 2025-02-20

## 2025-02-20 PROCEDURE — 2500000004 HC RX 250 GENERAL PHARMACY W/ HCPCS (ALT 636 FOR OP/ED)

## 2025-02-20 PROCEDURE — 3600000010 HC OR TIME - EACH INCREMENTAL 1 MINUTE - PROCEDURE LEVEL FIVE: Performed by: ORTHOPAEDIC SURGERY

## 2025-02-20 PROCEDURE — 2500000005 HC RX 250 GENERAL PHARMACY W/O HCPCS: Performed by: ORTHOPAEDIC SURGERY

## 2025-02-20 PROCEDURE — A4649 SURGICAL SUPPLIES: HCPCS | Performed by: ORTHOPAEDIC SURGERY

## 2025-02-20 PROCEDURE — 7100000002 HC RECOVERY ROOM TIME - EACH INCREMENTAL 1 MINUTE: Performed by: ORTHOPAEDIC SURGERY

## 2025-02-20 PROCEDURE — 3700000001 HC GENERAL ANESTHESIA TIME - INITIAL BASE CHARGE: Performed by: ORTHOPAEDIC SURGERY

## 2025-02-20 PROCEDURE — 7100000001 HC RECOVERY ROOM TIME - INITIAL BASE CHARGE: Performed by: ORTHOPAEDIC SURGERY

## 2025-02-20 PROCEDURE — 2500000004 HC RX 250 GENERAL PHARMACY W/ HCPCS (ALT 636 FOR OP/ED): Performed by: INTERNAL MEDICINE

## 2025-02-20 PROCEDURE — 2720000007 HC OR 272 NO HCPCS: Performed by: ORTHOPAEDIC SURGERY

## 2025-02-20 PROCEDURE — A6213 FOAM DRG >16<=48 SQ IN W/BDR: HCPCS | Performed by: ORTHOPAEDIC SURGERY

## 2025-02-20 PROCEDURE — 3700000002 HC GENERAL ANESTHESIA TIME - EACH INCREMENTAL 1 MINUTE: Performed by: ORTHOPAEDIC SURGERY

## 2025-02-20 PROCEDURE — 2500000004 HC RX 250 GENERAL PHARMACY W/ HCPCS (ALT 636 FOR OP/ED): Performed by: ORTHOPAEDIC SURGERY

## 2025-02-20 PROCEDURE — 7100000009 HC PHASE TWO TIME - INITIAL BASE CHARGE: Performed by: ORTHOPAEDIC SURGERY

## 2025-02-20 PROCEDURE — A23472 PR RECONSTR TOTAL SHOULDER IMPLANT: Performed by: STUDENT IN AN ORGANIZED HEALTH CARE EDUCATION/TRAINING PROGRAM

## 2025-02-20 PROCEDURE — 3600000005 HC OR TIME - INITIAL BASE CHARGE - PROCEDURE LEVEL FIVE: Performed by: ORTHOPAEDIC SURGERY

## 2025-02-20 PROCEDURE — 7100000010 HC PHASE TWO TIME - EACH INCREMENTAL 1 MINUTE: Performed by: ORTHOPAEDIC SURGERY

## 2025-02-20 PROCEDURE — 73030 X-RAY EXAM OF SHOULDER: CPT | Mod: RT

## 2025-02-20 PROCEDURE — 2780000003 HC OR 278 NO HCPCS: Performed by: ORTHOPAEDIC SURGERY

## 2025-02-20 PROCEDURE — C1776 JOINT DEVICE (IMPLANTABLE): HCPCS | Performed by: ORTHOPAEDIC SURGERY

## 2025-02-20 PROCEDURE — 2500000004 HC RX 250 GENERAL PHARMACY W/ HCPCS (ALT 636 FOR OP/ED): Performed by: ANESTHESIOLOGY

## 2025-02-20 PROCEDURE — 64415 NJX AA&/STRD BRCH PLXS IMG: CPT | Performed by: ANESTHESIOLOGY

## 2025-02-20 PROCEDURE — 2500000005 HC RX 250 GENERAL PHARMACY W/O HCPCS: Performed by: INTERNAL MEDICINE

## 2025-02-20 PROCEDURE — A23472 PR RECONSTR TOTAL SHOULDER IMPLANT: Performed by: INTERNAL MEDICINE

## 2025-02-20 PROCEDURE — C1713 ANCHOR/SCREW BN/BN,TIS/BN: HCPCS | Performed by: ORTHOPAEDIC SURGERY

## 2025-02-20 DEVICE — HUMERAL TRAY, STD +3 TAPER OFFSET, 40MM DIA: Type: IMPLANTABLE DEVICE | Site: SHOULDER | Status: FUNCTIONAL

## 2025-02-20 DEVICE — IMPLANTABLE DEVICE
Type: IMPLANTABLE DEVICE | Site: SHOULDER | Status: FUNCTIONAL
Brand: COMPREHENSIVE® REVERSE SHOULDER

## 2025-02-20 DEVICE — IMPLANTABLE DEVICE
Type: IMPLANTABLE DEVICE | Site: SHOULDER | Status: NON-FUNCTIONAL
Brand: STEINMANN PIN

## 2025-02-20 DEVICE — IMPLANTABLE DEVICE
Type: IMPLANTABLE DEVICE | Site: SHOULDER | Status: FUNCTIONAL
Brand: COMPREHENSIVE REVERSE SHOULDER

## 2025-02-20 DEVICE — HUMERAL. BEARING, 36MM STD PRLNG: Type: IMPLANTABLE DEVICE | Site: SHOULDER | Status: FUNCTIONAL

## 2025-02-20 DEVICE — IMPLANTABLE DEVICE: Type: IMPLANTABLE DEVICE | Site: SHOULDER | Status: FUNCTIONAL

## 2025-02-20 DEVICE — IMPLANTABLE DEVICE
Type: IMPLANTABLE DEVICE | Site: SHOULDER | Status: FUNCTIONAL
Brand: JUGGERKNOT SOFT ANCHORS

## 2025-02-20 RX ORDER — TRANEXAMIC ACID 100 MG/ML
INJECTION, SOLUTION INTRAVENOUS AS NEEDED
Status: DISCONTINUED | OUTPATIENT
Start: 2025-02-20 | End: 2025-02-20

## 2025-02-20 RX ORDER — DOXYCYCLINE HYCLATE 100 MG
100 TABLET ORAL 2 TIMES DAILY
Qty: 10 TABLET | Refills: 0 | Status: SHIPPED | OUTPATIENT
Start: 2025-02-20 | End: 2025-02-25

## 2025-02-20 RX ORDER — GLYCOPYRROLATE 0.2 MG/ML
INJECTION INTRAMUSCULAR; INTRAVENOUS AS NEEDED
Status: DISCONTINUED | OUTPATIENT
Start: 2025-02-20 | End: 2025-02-20

## 2025-02-20 RX ORDER — MIDAZOLAM HYDROCHLORIDE 1 MG/ML
2 INJECTION, SOLUTION INTRAMUSCULAR; INTRAVENOUS ONCE
Status: COMPLETED | OUTPATIENT
Start: 2025-02-20 | End: 2025-02-20

## 2025-02-20 RX ORDER — ROCURONIUM BROMIDE 10 MG/ML
INJECTION, SOLUTION INTRAVENOUS AS NEEDED
Status: DISCONTINUED | OUTPATIENT
Start: 2025-02-20 | End: 2025-02-20

## 2025-02-20 RX ORDER — ONDANSETRON HYDROCHLORIDE 2 MG/ML
4 INJECTION, SOLUTION INTRAVENOUS ONCE AS NEEDED
Status: DISCONTINUED | OUTPATIENT
Start: 2025-02-20 | End: 2025-02-20 | Stop reason: HOSPADM

## 2025-02-20 RX ORDER — MIDAZOLAM HYDROCHLORIDE 1 MG/ML
INJECTION, SOLUTION INTRAMUSCULAR; INTRAVENOUS AS NEEDED
Status: DISCONTINUED | OUTPATIENT
Start: 2025-02-20 | End: 2025-02-20

## 2025-02-20 RX ORDER — HYDROMORPHONE HYDROCHLORIDE 0.2 MG/ML
0.2 INJECTION INTRAMUSCULAR; INTRAVENOUS; SUBCUTANEOUS EVERY 5 MIN PRN
Status: DISCONTINUED | OUTPATIENT
Start: 2025-02-20 | End: 2025-02-20 | Stop reason: HOSPADM

## 2025-02-20 RX ORDER — NEOSTIGMINE METHYLSULFATE 1 MG/ML
INJECTION INTRAVENOUS AS NEEDED
Status: DISCONTINUED | OUTPATIENT
Start: 2025-02-20 | End: 2025-02-20

## 2025-02-20 RX ORDER — IPRATROPIUM BROMIDE 0.5 MG/2.5ML
500 SOLUTION RESPIRATORY (INHALATION) EVERY 30 MIN PRN
Status: DISCONTINUED | OUTPATIENT
Start: 2025-02-20 | End: 2025-02-20 | Stop reason: HOSPADM

## 2025-02-20 RX ORDER — VANCOMYCIN HYDROCHLORIDE 1 G/20ML
INJECTION, POWDER, LYOPHILIZED, FOR SOLUTION INTRAVENOUS AS NEEDED
Status: DISCONTINUED | OUTPATIENT
Start: 2025-02-20 | End: 2025-02-20 | Stop reason: HOSPADM

## 2025-02-20 RX ORDER — PROPOFOL 10 MG/ML
INJECTION, EMULSION INTRAVENOUS AS NEEDED
Status: DISCONTINUED | OUTPATIENT
Start: 2025-02-20 | End: 2025-02-20

## 2025-02-20 RX ORDER — LABETALOL HYDROCHLORIDE 5 MG/ML
5 INJECTION, SOLUTION INTRAVENOUS EVERY 5 MIN PRN
Status: DISCONTINUED | OUTPATIENT
Start: 2025-02-20 | End: 2025-02-20 | Stop reason: HOSPADM

## 2025-02-20 RX ORDER — MEPERIDINE HYDROCHLORIDE 25 MG/ML
12.5 INJECTION INTRAMUSCULAR; INTRAVENOUS; SUBCUTANEOUS EVERY 10 MIN PRN
Status: DISCONTINUED | OUTPATIENT
Start: 2025-02-20 | End: 2025-02-20 | Stop reason: HOSPADM

## 2025-02-20 RX ORDER — LIDOCAINE HYDROCHLORIDE 10 MG/ML
INJECTION, SOLUTION EPIDURAL; INFILTRATION; INTRACAUDAL; PERINEURAL AS NEEDED
Status: DISCONTINUED | OUTPATIENT
Start: 2025-02-20 | End: 2025-02-20

## 2025-02-20 RX ORDER — ALBUTEROL SULFATE 0.83 MG/ML
2.5 SOLUTION RESPIRATORY (INHALATION) EVERY 30 MIN PRN
Status: DISCONTINUED | OUTPATIENT
Start: 2025-02-20 | End: 2025-02-20 | Stop reason: HOSPADM

## 2025-02-20 RX ORDER — FENTANYL CITRATE 50 UG/ML
INJECTION, SOLUTION INTRAMUSCULAR; INTRAVENOUS AS NEEDED
Status: DISCONTINUED | OUTPATIENT
Start: 2025-02-20 | End: 2025-02-20

## 2025-02-20 RX ORDER — DIPHENHYDRAMINE HYDROCHLORIDE 50 MG/ML
INJECTION INTRAMUSCULAR; INTRAVENOUS AS NEEDED
Status: DISCONTINUED | OUTPATIENT
Start: 2025-02-20 | End: 2025-02-20

## 2025-02-20 RX ORDER — OXYCODONE HYDROCHLORIDE 5 MG/1
5 TABLET ORAL EVERY 6 HOURS PRN
Qty: 28 TABLET | Refills: 0 | Status: SHIPPED | OUTPATIENT
Start: 2025-02-20 | End: 2025-02-27

## 2025-02-20 RX ORDER — SODIUM CHLORIDE, SODIUM LACTATE, POTASSIUM CHLORIDE, CALCIUM CHLORIDE 600; 310; 30; 20 MG/100ML; MG/100ML; MG/100ML; MG/100ML
40 INJECTION, SOLUTION INTRAVENOUS CONTINUOUS
Status: DISCONTINUED | OUTPATIENT
Start: 2025-02-20 | End: 2025-02-20 | Stop reason: HOSPADM

## 2025-02-20 RX ORDER — VANCOMYCIN 1 G/200ML
1000 INJECTION, SOLUTION INTRAVENOUS ONCE
Status: DISCONTINUED | OUTPATIENT
Start: 2025-02-20 | End: 2025-02-20

## 2025-02-20 RX ORDER — KETOROLAC TROMETHAMINE 10 MG/1
10 TABLET, FILM COATED ORAL EVERY 6 HOURS PRN
Qty: 20 TABLET | Refills: 0 | Status: SHIPPED | OUTPATIENT
Start: 2025-02-20

## 2025-02-20 RX ORDER — FENTANYL CITRATE 50 UG/ML
50 INJECTION, SOLUTION INTRAMUSCULAR; INTRAVENOUS ONCE AS NEEDED
Status: COMPLETED | OUTPATIENT
Start: 2025-02-20 | End: 2025-02-20

## 2025-02-20 RX ORDER — NORETHINDRONE AND ETHINYL ESTRADIOL 0.5-0.035
KIT ORAL AS NEEDED
Status: DISCONTINUED | OUTPATIENT
Start: 2025-02-20 | End: 2025-02-20

## 2025-02-20 RX ORDER — ONDANSETRON 4 MG/1
4 TABLET, FILM COATED ORAL EVERY 8 HOURS PRN
Qty: 12 TABLET | Refills: 0 | Status: SHIPPED | OUTPATIENT
Start: 2025-02-20

## 2025-02-20 RX ORDER — VANCOMYCIN 1.5 G/300ML
1500 INJECTION, SOLUTION INTRAVENOUS ONCE
Status: COMPLETED | OUTPATIENT
Start: 2025-02-20 | End: 2025-02-20

## 2025-02-20 RX ORDER — FENTANYL CITRATE 50 UG/ML
50 INJECTION, SOLUTION INTRAMUSCULAR; INTRAVENOUS EVERY 5 MIN PRN
Status: DISCONTINUED | OUTPATIENT
Start: 2025-02-20 | End: 2025-02-20 | Stop reason: HOSPADM

## 2025-02-20 RX ADMIN — ROCURONIUM BROMIDE 50 MG: 10 INJECTION INTRAVENOUS at 08:14

## 2025-02-20 RX ADMIN — FENTANYL CITRATE 50 MCG: 50 INJECTION INTRAMUSCULAR; INTRAVENOUS at 08:14

## 2025-02-20 RX ADMIN — VANCOMYCIN 1.5 G: 1.5 INJECTION, SOLUTION INTRAVENOUS at 07:18

## 2025-02-20 RX ADMIN — FENTANYL CITRATE 50 MCG: 50 INJECTION INTRAMUSCULAR; INTRAVENOUS at 08:58

## 2025-02-20 RX ADMIN — DEXAMETHASONE SODIUM PHOSPHATE 8 MG: 4 INJECTION, SOLUTION INTRAMUSCULAR; INTRAVENOUS at 08:25

## 2025-02-20 RX ADMIN — EPHEDRINE SULFATE 15 MG: 50 INJECTION, SOLUTION INTRAVENOUS at 08:54

## 2025-02-20 RX ADMIN — FENTANYL CITRATE 50 MCG: 50 INJECTION INTRAMUSCULAR; INTRAVENOUS at 07:28

## 2025-02-20 RX ADMIN — PROPOFOL 50 MG: 10 INJECTION, EMULSION INTRAVENOUS at 08:20

## 2025-02-20 RX ADMIN — EPHEDRINE SULFATE 35 MG: 50 INJECTION, SOLUTION INTRAVENOUS at 08:53

## 2025-02-20 RX ADMIN — SODIUM CHLORIDE, POTASSIUM CHLORIDE, SODIUM LACTATE AND CALCIUM CHLORIDE: 600; 310; 30; 20 INJECTION, SOLUTION INTRAVENOUS at 08:00

## 2025-02-20 RX ADMIN — LIDOCAINE HYDROCHLORIDE 5 ML: 10 INJECTION, SOLUTION EPIDURAL; INFILTRATION; INTRACAUDAL; PERINEURAL at 08:14

## 2025-02-20 RX ADMIN — DIPHENHYDRAMINE HYDROCHLORIDE 25 MG: 50 INJECTION INTRAMUSCULAR; INTRAVENOUS at 08:25

## 2025-02-20 RX ADMIN — MIDAZOLAM 2 MG: 1 INJECTION INTRAMUSCULAR; INTRAVENOUS at 08:14

## 2025-02-20 RX ADMIN — VANCOMYCIN 1500 MG: 1.5 INJECTION, SOLUTION INTRAVENOUS at 08:25

## 2025-02-20 RX ADMIN — MIDAZOLAM 2 MG: 1 INJECTION INTRAMUSCULAR; INTRAVENOUS at 07:27

## 2025-02-20 RX ADMIN — PROPOFOL 150 MG: 10 INJECTION, EMULSION INTRAVENOUS at 08:14

## 2025-02-20 RX ADMIN — POVIDONE-IODINE 1 APPLICATION: 5 SOLUTION TOPICAL at 07:17

## 2025-02-20 RX ADMIN — GLYCOPYRROLATE 0.2 MG: 0.2 INJECTION INTRAMUSCULAR; INTRAVENOUS at 09:54

## 2025-02-20 RX ADMIN — NEOSTIGMINE METHYLSULFATE 1 MG: 1 INJECTION, SOLUTION INTRAVENOUS at 09:54

## 2025-02-20 RX ADMIN — TRANEXAMIC ACID 1000 MG: 100 INJECTION, SOLUTION INTRAVENOUS at 08:25

## 2025-02-20 SDOH — HEALTH STABILITY: MENTAL HEALTH: CURRENT SMOKER: 0

## 2025-02-20 ASSESSMENT — PAIN SCALES - GENERAL
PAINLEVEL_OUTOF10: 0 - NO PAIN
PAINLEVEL_OUTOF10: 2
PAINLEVEL_OUTOF10: 0 - NO PAIN
PAINLEVEL_OUTOF10: 2
PAINLEVEL_OUTOF10: 0 - NO PAIN
PAINLEVEL_OUTOF10: 0 - NO PAIN

## 2025-02-20 ASSESSMENT — PAIN - FUNCTIONAL ASSESSMENT
PAIN_FUNCTIONAL_ASSESSMENT: 0-10
PAIN_FUNCTIONAL_ASSESSMENT: WONG-BAKER FACES
PAIN_FUNCTIONAL_ASSESSMENT: 0-10

## 2025-02-20 ASSESSMENT — COLUMBIA-SUICIDE SEVERITY RATING SCALE - C-SSRS
1. IN THE PAST MONTH, HAVE YOU WISHED YOU WERE DEAD OR WISHED YOU COULD GO TO SLEEP AND NOT WAKE UP?: NO
2. HAVE YOU ACTUALLY HAD ANY THOUGHTS OF KILLING YOURSELF?: NO
6. HAVE YOU EVER DONE ANYTHING, STARTED TO DO ANYTHING, OR PREPARED TO DO ANYTHING TO END YOUR LIFE?: NO

## 2025-02-20 NOTE — ANESTHESIA PREPROCEDURE EVALUATION
Patient: Galo Sanchez    Procedure Information       Date/Time: 02/20/25 0745    Procedure: RIGHT REVERSE TOTAL SHOULDER ARTHROPLASTY (SWATI BIOMET) (Right: Shoulder)    Location: [facility] / [facility]    Surgeons: Dhiraj Zapata DO          Past Medical History:   Diagnosis Date   • Alcohol abuse    • Depression    • History of PTCA    • HTN (hypertension)    • Hyperlipidemia    • Prostate cancer (Multi)      Past Surgical History:   Procedure Laterality Date   • CORONARY ANGIOPLASTY     • OTHER SURGICAL HISTORY  09/15/2022    Knee replacement   • OTHER SURGICAL HISTORY  09/14/2022    Mastoidectomy       Relevant Problems   Anesthesia (within normal limits)      Cardiac   (+) Arteriosclerotic heart disease   (+) First degree atrioventricular block   (+) HTN (hypertension)   (+) Hyperlipidemia      Pulmonary   (+) Asthma      /Renal   (+) Prostate cancer (Multi)      Musculoskeletal   (+) Degenerative joint disease of hand   (+) Osteoarthritis   (+) Osteoarthritis of lumbar spine      HEENT   (+) Acute sinusitis   (+) Mixed conductive and sensorineural hearing loss of both ears       Clinical information reviewed:                   NPO Detail:  No data recorded     Physical Exam    Airway  Mallampati: II  TM distance: >3 FB  Neck ROM: full     Cardiovascular   Comments: deferred   Dental   Comments: No loose teeth   Pulmonary   Comments: deferred   Abdominal     Comments: deferred         Anesthesia Plan    History of general anesthesia?: yes  History of complications of general anesthesia?: no    ASA 3     general and regional     The patient is not a current smoker.  Patient was not previously instructed to abstain from smoking on day of procedure.  Patient did not smoke on day of procedure.  Education provided regarding risk of obstructive sleep apnea.  intravenous induction   Postoperative administration of opioids is intended.  Anesthetic plan and risks discussed with patient.  Use of blood  products discussed with patient who consented to blood products.    Plan discussed with CRNA and CAA.

## 2025-02-20 NOTE — ANESTHESIA PROCEDURE NOTES
Airway  Date/Time: 2/20/2025 8:17 AM  Urgency: elective    Airway not difficult    Staffing  Performed: CRNA   Authorized by: Jose Navarro DO    Performed by: ALPESH Christie-CNP, APRN-CRNA  Patient location during procedure: OR    Indications and Patient Condition  Indications for airway management: anesthesia  Spontaneous ventilation: present  Sedation level: deep  Preoxygenated: yes  Patient position: sniffing  MILS maintained throughout  Mask difficulty assessment: 1 - vent by mask  No planned trial extubation    Final Airway Details  Final airway type: endotracheal airway      Successful airway: ETT  Cuffed: yes   Successful intubation technique: direct laryngoscopy  Blade: Yajaira  Blade size: #4  ETT size (mm): 7.5  Cormack-Lehane Classification: grade IIb - view of arytenoids or posterior of glottis only  Placement verified by: capnometry   Measured from: teeth  ETT to teeth (cm): 23  Number of attempts at approach: 1

## 2025-02-20 NOTE — PERIOPERATIVE NURSING NOTE
Pt received from OR via cart, monitors on, report received. Pt noted with OPA, resp shallow, intermittent. CRNA assisted with repositioning for improved airway exchange.   NRB exchanged for SFM, attending @ bedside. Pt increased Spo2 to 93%. Cont to monitor.  Operative extremity and sling assessment completed as documented. Order reviewed/released.

## 2025-02-20 NOTE — ANESTHESIA POSTPROCEDURE EVALUATION
Patient: Galo Sanchez    Procedure Summary       Date: 02/20/25 Room / Location: [facility]    Anesthesia Start: 0810 Anesthesia Stop: 1014    Procedure: RIGHT REVERSE TOTAL SHOULDER ARTHROPLASTY (SWATI BIOMET) (Right: Shoulder) Diagnosis:       Arthritis of right shoulder region      (M19.011)    Surgeons: Dhiraj Zapata DO Responsible Provider: Jose Navarro DO    Anesthesia Type: general, regional ASA Status: 3            Anesthesia Type: general, regional    Vitals Value Taken Time   /76 02/20/25 1035   Temp 35.9 °C (96.6 °F) 02/20/25 1005   Pulse 65 02/20/25 1035   Resp 13 02/20/25 1035   SpO2 93 % 02/20/25 1035       Anesthesia Post Evaluation    Patient location during evaluation: bedside  Patient participation: complete - patient participated  Level of consciousness: awake and alert  Pain management: adequate  Multimodal analgesia pain management approach  Airway patency: patent  Two or more strategies used to mitigate risk of obstructive sleep apnea  Cardiovascular status: acceptable  Respiratory status: acceptable  Hydration status: acceptable  Postoperative Nausea and Vomiting: none        There were no known notable events for this encounter.

## 2025-02-20 NOTE — PERIOPERATIVE NURSING NOTE
Surgeon has seen post op film.  Pt given ginger ale, tolerating. Neurovascular assessment remains negative, no changes.

## 2025-02-20 NOTE — ANESTHESIA PROCEDURE NOTES
Peripheral Block    Patient location during procedure: pre-op  Start time: 2/20/2025 7:33 AM  End time: 2/20/2025 7:34 AM  Reason for block: at surgeon's request and post-op pain management  Staffing  Performed: attending   Authorized by: Shawn Carcamo MD    Performed by: Shawn Carcamo MD  Preanesthetic Checklist  Completed: patient identified, IV checked, site marked, risks and benefits discussed, surgical consent, monitors and equipment checked, pre-op evaluation and timeout performed   Timeout performed at: 2/20/2025 7:27 AM  Peripheral Block  Patient position: sitting  Prep: ChloraPrep  Patient monitoring: heart rate, cardiac monitor and continuous pulse ox  Block type: brachial plexus and interscalene  Laterality: right  Injection technique: single-shot  Guidance: nerve stimulator and ultrasound guided  Local infiltration: lidocaine  Needle  Needle type: short-bevel   Needle gauge: 20 G  Needle length: 5 cm  Needle localization: anatomical landmarks, nerve stimulator and ultrasound guidance  Assessment  Injection assessment: negative aspiration for heme, no paresthesia on injection, incremental injection and local visualized surrounding nerve on ultrasound  Paresthesia pain: none  Heart rate change: no  Slow fractionated injection: yes

## 2025-02-20 NOTE — OP NOTE
RIGHT REVERSE TOTAL SHOULDER ARTHROPLASTY (SWATI BIOMET) (R) Operative Note     Date: 2025  OR Location: [facility]    Name: Galo Sanchez : 1955, Age: 69 y.o., MRN: [id number], Sex: male    Diagnosis  Pre-op Diagnosis      * Arthritis of right shoulder region [M19.011] Post-op Diagnosis     * Arthritis of right shoulder region [M19.011]     Procedures  RIGHT REVERSE TOTAL SHOULDER ARTHROPLASTY (SWATI BIOMET)  26641 - AL ARTHROPLASTY GLENOHUMERAL JOINT TOTAL SHOULDER    Right reverse shoulder arthroplasty with associated open long head bicep tenodesis    Surgeons      * Dhiraj Zapata - Primary    Resident/Fellow/Other Assistant:  Surgeons and Role:  * No surgeons found with a matching role *    Staff:   Scrub Person: Fidelia  Scrub Person: Abhishek  Circulator: Maia  Circulator: Natalia  Scrub Person: Bonnie    Anesthesia Staff: Anesthesiologist: Jose Navarro DO  CRNA: William Kim, APRN-CNP, APRN-CRNA    Procedure Summary  Anesthesia: Regional, General  ASA: III  Estimated Blood Loss: 100 mL  Intra-op Medications:   Administrations occurring from 0745 to 0955 on 25:   Medication Name Total Dose   vancomycin (Vancocin) vial for injection 1 g   dexAMETHasone (Decadron) injection 4 mg/mL 8 mg   diphenhydrAMINE 50 mg/mL 25 mg   ePHEDrine injection 50 mg   fentaNYL (Sublimaze) injection 50 mcg/mL 100 mcg   lidocaine PF (Xylocaine-MPF) local injection 1 % 5 mL   midazolam (Versed) injection 1 mg/mL 2 mg   propofol (Diprivan) injection 10 mg/mL 200 mg   rocuronium (ZeMuron) 50 mg/5 mL injection 50 mg   vancomycin (Xellia) 1.5 g in diluent combination  mL 300 mL              Anesthesia Record               Intraprocedure I/O Totals          Intake    vancomycin (Xellia) 1.5 g in diluent combination  mL 300.00 mL    Total Intake 300 mL          Specimen: No specimens collected              Drains and/or Catheters: * None in log *    Tourniquet Times:         Implants:  Implants        Type Name Action Serial No.      Screw PIN, BRENDA, THREADED TIP, 1/8 X 2.5 IN, LONG - [id number] Used, Not Implanted      Joint BASEPLATE, AUGMENTED, W/ TAPER ADAPTER, SMALL - [id number] Implanted      Joint CENTRAL SCREW, 6.5 X 30MM - [id number] Implanted      Screw SCREW, COMP, LOCKING, 3.5 HEX, 4.75 X 25MM - [id number] Implanted      Screw SCREW, HEXALOBE, LOCKING, 4.75 X 15MM - [id number] Implanted      Screw SCREW, COMP, LOCKING, 3.5 HEX, 4.75 X 30MM - [id number] Implanted [id number]     Screw SCREW, HEXALOBE, LOCKING, 4.75 X 15MM - [id number] Implanted      Joint GLENOSPHERE, VERSA-DIAL, 36MM, STANDARD - [id number] Implanted      Joint HUMERAL. BEARING, 36MM STD PRLNG - [id number] Implanted      Joint HUMERAL TRAY, STD +3 TAPER OFFSET, 40MM BABAR - [id number] Implanted      Implant JUGGERKNOT, 2.9MM, DBL LOADED, P2 MAX BRAID, W/ TAPERED NEEDLE - [id number] Implanted      Joint HUMERAL STEM, COMPREHENSIVE, 11MM X 83MM, MINI - [id number] Implanted               Findings: Confirmation of chronically torn supraspinatus and infraspinatus with retraction.  Grossly intact subscapularis.  Partial-thickness tearing of the intra-articular portion of long head of the bicep.  Abundant subacromial and subdeltoid adhesions.  Stability to the replacement upon completion    Indications: Galo Sanchez is an 69 y.o. male who is having surgery for M19.011.     The patient was seen in the preoperative area. The risks, benefits, complications, treatment options, non-operative alternatives, expected recovery and outcomes were discussed with the patient. The possibilities of reaction to medication, pulmonary aspiration, injury to surrounding structures, bleeding, recurrent infection, the need for additional procedures, failure to diagnose a condition, and creating a complication requiring transfusion or operation were discussed with the patient. The patient concurred with the proposed plan,  giving informed consent.  The site of surgery was properly noted/marked if necessary per policy. The patient has been actively warmed in preoperative area. Preoperative antibiotics have been ordered and given within 1 hours of incision. Venous thrombosis prophylaxis have been ordered including bilateral sequential compression devices    Procedure Details: After obtaining informed surgical consent and the administration of prophylactic antibiotics the patient underwent successful regional block in the preoperative holding area.  He was then brought to the operating room and placed supine on the operative table where successful general anesthetic was administered.  Patient placed into a well-padded beachchair position.  Sterile prep and drape of the right shoulder completed utilizing standard orthopedic protocol.  A roughly 12 cm deltopectoral incision was made with dissection carried down to the cephalic vein.  Hemostasis obtained throughout the procedure with the use of intravenous TXA and electrocautery.  The interval between the deltoid and the pectoralis was developed and the cephalic vein retracted laterally.  Clavipectoral fascia was debrided in addition to the subdeltoid and subacromial adhesions which were broken.  Blunt dissection identified the axillary nerve which was carefully protected throughout the procedure.  The bicipital tendon sheath was incised revealing the bicep tendon.  The bicep was tenodesed at the level of the pectoralis major and then tenotomized proximal to that.  Biceps tendon sheath followed proximally revealing the above findings of the rotator cuff musculature.  Subscapularis peel performed with associated release of the anterior capsular structures allowing for dislocation of the glenohumeral joint.  A starter awl was inserted into the humeral head followed by sequential reaming to an 12 stem for the [employer] comprehensive reverse shoulder system.  Humeral head osteotomy  completed at 20 degrees of retroversion.  Broaching then completed to an 12 stem.  Attention turned to the glenoid.  Retractors carefully placed followed by anterior capsulectomy and debridement of the glenoid labrum and residual biceps stump.  Guidepin was then inserted into the inferior third of the glenoid followed by reaming of the glenoid surface.  Preoperative planning identified the need for a small superior augment.  Irrigation completed.  Small superior augment mini baseplate was press-fit into position And then secured with a central screw and 4 peripheral locking screws.  Irrigation completed once more followed by placement of a 36 mm glenosphere and standard offset.  Stability confirmed.  Attention turned back to the to the humerus.  Attempts at realization of a standard tray and liner would not provide for adequate reduction of the glenohumeral joint.  With that noted the trial components were removed from the humerus.  An additional cut was then made on the humerus.  Additional broaching was performed and now an 11 stem provided adequate and stable fit.  Trialing of a +3 humeral tray and standard liner was completed with appropriate stability obtained.  Shoulder joint dislocated with trial components removed.  Irrigation performed.  Final 11 mini stem and +3 offset tray and standard liner were press-fit into position and then relocation of the joint completed.  Stability confirmed.  Subscapularis reapproximated with suture anchors.  Final irrigation completed followed by placement of vancomycin powder deep to the wound.  Wound closure obtained with 3-0 Vicryl in a subcuticular stitch.  Soft sterile dressing was applied in addition to an abduction sling.  Patient was then awakened, extubated, transferred to hospital bed and taken the postanesthesia care unit in stable condition.  Complications:  None; patient tolerated the procedure well.    Disposition: PACU - hemodynamically stable.  Condition: stable                  Additional Details: Patient to follow-up in the [place] office on 3/7/2025 at 8:45 AM    Attending Attestation: I performed the procedure.    Dhiraj Zapata  Phone Number: [phone]

## 2025-03-12 ENCOUNTER — EVALUATION (OUTPATIENT)
Dept: PHYSICAL THERAPY | Facility: CLINIC | Age: 70
End: 2025-03-12
Payer: COMMERCIAL

## 2025-03-12 DIAGNOSIS — M19.011 PRIMARY OSTEOARTHRITIS, RIGHT SHOULDER: ICD-10-CM

## 2025-03-12 PROCEDURE — 97110 THERAPEUTIC EXERCISES: CPT | Mod: GP

## 2025-03-12 PROCEDURE — 97161 PT EVAL LOW COMPLEX 20 MIN: CPT | Mod: GP

## 2025-03-12 NOTE — PROGRESS NOTES
Physical Therapy    Physical Therapy Evaluation and Treatment    Patient Name: Galo Sanchez  MRN: [id number]  Encounter Date: 3/12/2025     Time Calculation  Start Time: 1000  Stop Time: 1030  Time Calculation (min): 30 min    Current Problem:   1. Primary osteoarthritis, right shoulder  Referral to Physical Therapy    Follow Up In Physical Therapy          Relevant Past Medical History: Cancer, chest pain, emphysema  Surgical History: Reverse total shoulder 02/20    Precautions: NO SHOULDER IR, ADDUCTION, EXTENSION OR CROSS BODY MOVEMENT          SUBJECTIVE:   Patient presents to physical therapy status post right reverse total shoulder arthroplasty (rTSA) performed on 02/20/2025 by Dr. Zapata. The patient reports a pain level of 3/10 at rest and with light activity, describing the pain as a dull ache localized to the anterior and lateral shoulder region. No reports of numbness, tingling, or sharp pain. Patient denies any significant swelling or bruising. States that sleeping has been moderately comfortable with the use of a sling, and the pain is well-managed with prescribed medication.    Patient expresses understanding of the surgical procedure and post-operative restrictions. Primary goal is to regain functional shoulder range of motion and strength to improve independence with activities of daily living (ADLs), including dressing, grooming, and reaching overhead.    Imaging:   N/A    Pain:   Current: 3/10      OBJECTIVE:    SPADI: 20/130    Inspection:    Sling properly positioned with no signs of skin irritation.  No significant swelling or ecchymosis observed.  Surgical incision appears clean and well-approximated with no signs of infection.  Pain:    3/10 at rest and with light movement  Passive Range of Motion (PROM):  Performed within post-operative guidelines, with patient tolerating well:    Shoulder Flexion: ~90° with mild discomfort  External Rotation: ~20° with end-range stiffness  Abduction: Light  abduction performed with minimal discomfort    Strength:    Initiated sub-maximal isometrics (flexion, abduction, external rotation) with good patient engagement and no increase in pain or compensation patterns.  Good scapular engagement noted during isometrics.  Functional Mobility:    Patient demonstrates independence in donning/doffing sling.  Able to support arm during seated and standing activities with minimal compensation.  Neurovascular:    Intact sensation throughout C5-T1 dermatomes.  Capillary refill and distal pulses intact.    Treatments:  Therapeutic Exercise: (15 minutes)   Access Code: [email or web address]  URL: [email or web address]  Date: 03/12/2025  Prepared by: Arie Schwarz    Exercises  - Circular Shoulder Pendulum with Table Support  - 1 x daily - 7 x weekly - 3 sets - 10 reps  - Seated Elbow Flexion and Extension AROM  - 1 x daily - 7 x weekly - 3 sets - 10 reps  - Wrist Flexion AROM  - 1 x daily - 7 x weekly - 3 sets - 10 reps  - Wrist Extension AROM  - 1 x daily - 7 x weekly - 3 sets - 10 reps  - Seated Scapular Retraction  - 1 x daily - 7 x weekly - 3 sets - 10 reps  - Standing Shoulder Shrugs  - 1 x daily - 7 x weekly - 3 sets - 10 reps  - Isometric Shoulder Flexion at Wall  - 1 x daily - 7 x weekly - 5 sets - 20 hold  - Isometric Shoulder External Rotation at Wall  - 1 x daily - 7 x weekly - 5 sets - 20 hold    PROM shoulder flexion, abd, ER     OP Education:   Reviewed importance of sling use and post-operative precautions (avoiding internal rotation, adduction, and extension).  Discussed expected progression of ROM and strengthening over the next 4-6 weeks.  Reinforced importance of scapular stabilization and proper shoulder mechanics during early recovery.       HEP:  Access Code: [email or web address]  URL: [email or web address]  Date: 03/12/2025  Prepared by: Arie Macielic    Exercises  - Circular Shoulder Pendulum with Table Support  - 1 x daily - 7 x weekly - 3 sets - 10 reps  - Seated  Elbow Flexion and Extension AROM  - 1 x daily - 7 x weekly - 3 sets - 10 reps  - Wrist Flexion AROM  - 1 x daily - 7 x weekly - 3 sets - 10 reps  - Wrist Extension AROM  - 1 x daily - 7 x weekly - 3 sets - 10 reps  - Seated Scapular Retraction  - 1 x daily - 7 x weekly - 3 sets - 10 reps  - Standing Shoulder Shrugs  - 1 x daily - 7 x weekly - 3 sets - 10 reps  - Isometric Shoulder Flexion at Wall  - 1 x daily - 7 x weekly - 5 sets - 20 hold  - Isometric Shoulder External Rotation at Wall  - 1 x daily - 7 x weekly - 5 sets - 20 hold    Response to treatment: good     ASSESSMENT:   Patient is progressing appropriately in the early post-operative phase following right reverse total shoulder arthroplasty (s/p 02/20/25). Demonstrates good tolerance to PROM and light manual therapy without significant increase in pain or discomfort. Initiation of sub-maximal isometrics well tolerated with good scapular engagement. No signs of post-operative complications such as infection, instability, or neurovascular compromise noted at this time.    Patient demonstrates good understanding of post-operative precautions and early-stage home exercise program. Early goals will focus on improving passive range of motion, reducing pain, and increasing scapular stability.        Complexity of Evaluation: low  Based on the history including personal factors and/or comorbidities, examination of body systems including body structures and function, activity limitations, and/or participation restrictions, as well as clinical presentation, patient meets criteria for a low complexity evaluation.     PLAN:  OP PT PLAN:  Treatment/Interventions: Cryotherapy , Education/Instruction , Manual Therapy  , Neuromuscular re-education , Therapeutic activities , and Therapeutic exercise    PT Plan: Skilled PT   PT Frequency: 2 times per week   Duration: 4 weeks   Insurance:  [employer] - NO AUTH / 100% COVERAGE thru 12/31/25 / $50 COPAY waived thru 12/31/25 / $3500  OOP MET / 20V/YR - 0 USED / REF# [id number] / ds 3/11/25   Visits Approved: 20  Rehab Potential: Good  Plan of Care Agreement: Patient         Goals:   Achieve active shoulder flexion to 150-160° and external rotation to 60° with good scapular mechanics and no pain.  Regain sufficient strength and stability to perform functional activities, including overhead reaching and lifting light objects (<=5 lbs) without pain or compensation.  Demonstrate proper scapulohumeral rhythm and improved strength in rotator cuff and scapular stabilizers.  Return to independent performance of all ADLs, including reaching overhead, grooming, dressing, and light household tasks.  Achieve pain level of <=1/10 during functional activities.

## 2025-03-17 ENCOUNTER — TREATMENT (OUTPATIENT)
Dept: PHYSICAL THERAPY | Facility: CLINIC | Age: 70
End: 2025-03-17
Payer: COMMERCIAL

## 2025-03-17 DIAGNOSIS — M19.011 PRIMARY OSTEOARTHRITIS, RIGHT SHOULDER: ICD-10-CM

## 2025-03-17 PROCEDURE — 97110 THERAPEUTIC EXERCISES: CPT | Mod: GP

## 2025-03-17 PROCEDURE — 97140 MANUAL THERAPY 1/> REGIONS: CPT | Mod: GP

## 2025-03-17 NOTE — PROGRESS NOTES
Physical Therapy    Physical Therapy Treatment    Patient Name: Galo Sanchez  MRN: [id number]  Encounter Date: 3/17/2025     Time Calculation  Start Time: 1100  Stop Time: 1140  Time Calculation (min): 40 min    Visit #: 2    Insurance: [employer] - NO AUTH / 100% COVERAGE thru 12/31/25 / $50 COPAY waived thru 12/31/25 / $3500 OOP MET / 20V/YR - 0 USED / REF# [id number] / ds 3/11/25.   Evaluation date: 03/13/25      Current Problem:   1. Primary osteoarthritis, right shoulder  Follow Up In Physical Therapy          SUBJECTIVE:   Pt reports HEP going well     Precautions: NO SHOULDER IR, ADDUCTION, EXTENSION OR CROSS BODY MOVEMENT         Pain:   Start of session: 2/10       OBJECTIVE:    Good tolerance to exercise     Treatments:  Therapeutic Exercise: (30 minutes)   Exercises  - Circular Shoulder Pendulum with Table Support  - 1 x daily - 7 x weekly - 3 sets - 10 reps  - Seated Elbow Flexion and Extension AROM  - 1 x daily - 7 x weekly - 3 sets - 10 reps  - Wrist Flexion AROM  - 1 x daily - 7 x weekly - 3 sets - 10 reps  - Wrist Extension AROM  - 1 x daily - 7 x weekly - 3 sets - 10 reps  - Seated Scapular Retraction  - 1 x daily - 7 x weekly - 3 sets - 10 reps  - Standing Shoulder Shrugs  - 1 x daily - 7 x weekly - 3 sets - 10 reps  - Isometric Shoulder Flexion at Wall  - 1 x daily - 7 x weekly - 5 sets - 20 hold  - Isometric Shoulder External Rotation at Wall  - 1 x daily - 7 x weekly - 5 sets - 20 hold    Added to HEP:  Seated physioball ball reach 3 x 10  Seated table slide   Pulleys x 2 min (flexion only)        Manual Therapy: (10 minutes)  PROM shoulder flexion, abd,ER        HEP:  See above     ASSESSMENT:   Emphasis of todays treatment was to focus on manual to help decrease pain and improve mobility, shoulder PROM exercises as well as shoulder isometrics for light strengthening. Pt will cont to progress with skilled PT to continue to address above impairments       Post session pain: 2/10     PLAN:  Cont to  progress as able     Goals:   Progressing

## 2025-03-19 ENCOUNTER — APPOINTMENT (OUTPATIENT)
Dept: PHYSICAL THERAPY | Facility: CLINIC | Age: 70
End: 2025-03-19
Payer: COMMERCIAL

## 2025-03-24 ENCOUNTER — TREATMENT (OUTPATIENT)
Dept: PHYSICAL THERAPY | Facility: CLINIC | Age: 70
End: 2025-03-24
Payer: COMMERCIAL

## 2025-03-24 DIAGNOSIS — M19.011 PRIMARY OSTEOARTHRITIS, RIGHT SHOULDER: ICD-10-CM

## 2025-03-24 PROCEDURE — 97110 THERAPEUTIC EXERCISES: CPT | Mod: GP

## 2025-03-24 PROCEDURE — 97140 MANUAL THERAPY 1/> REGIONS: CPT | Mod: GP

## 2025-03-25 NOTE — PROGRESS NOTES
Physical Therapy    Physical Therapy Treatment    Patient Name: Galo Sanchez  MRN: [id number]  Encounter Date: 3/24/2025     Time Calculation  Start Time: 1100  Stop Time: 1140  Time Calculation (min): 40 min    Visit #: 3    Insurance: [employer] - NO AUTH / 100% COVERAGE thru 12/31/25 / $50 COPAY waived thru 12/31/25 / $3500 OOP MET / 20V/YR - 0 USED / REF# [id number] / ds 3/11/25.   Evaluation date: 03/13/25      Current Problem:   1. Primary osteoarthritis, right shoulder  Follow Up In Physical Therapy          SUBJECTIVE:   Pt reports HEP going well     Precautions: NO SHOULDER IR, ADDUCTION, EXTENSION OR CROSS BODY MOVEMENT         Pain:   Start of session: 2/10       OBJECTIVE:    Good tolerance to exercise     Treatments:  Therapeutic Exercise: (30 minutes)   Exercises  - Circular Shoulder Pendulum with Table Support  - 1 x daily - 7 x weekly - 3 sets - 10 reps  - Seated Elbow Flexion and Extension AROM  - 1 x daily - 7 x weekly - 3 sets - 10 reps  - Wrist Flexion AROM  - 1 x daily - 7 x weekly - 3 sets - 10 reps  - Wrist Extension AROM  - 1 x daily - 7 x weekly - 3 sets - 10 reps  - Seated Scapular Retraction  - 1 x daily - 7 x weekly - 3 sets - 10 reps  - Standing Shoulder Shrugs  - 1 x daily - 7 x weekly - 3 sets - 10 reps  - Isometric Shoulder Flexion at Wall  - 1 x daily - 7 x weekly - 5 sets - 20 hold  - Isometric Shoulder External Rotation at Wall  - 1 x daily - 7 x weekly - 5 sets - 20 hold    Seated physioball ball reach 3 x 10  Seated table slide   Pulleys x 2 min (flexion only)       Added to HEP 2 x10  Dowel sarita chest press  Dowel sarita ER  Dowel sarita OH          Manual Therapy: (10 minutes)  PROM shoulder flexion, abd,ER        HEP:  See above     ASSESSMENT:   Emphasis of todays treatment was to cont to focus on manual to help decrease pain and improve mobility, shoulder PROM exercises as well as shoulder isometrics for light strengthening. Pt is progressing well within this phase of the protocol we will  look to go down to once a week until the next phase begins ( strengthening)  Pt will cont to progress with skilled PT to continue to address above impairments       Post session pain: 2/10     PLAN:  Cont to progress as able     Goals:   Progressing

## 2025-03-27 ENCOUNTER — APPOINTMENT (OUTPATIENT)
Dept: PHYSICAL THERAPY | Facility: CLINIC | Age: 70
End: 2025-03-27
Payer: COMMERCIAL

## 2025-03-31 ENCOUNTER — TREATMENT (OUTPATIENT)
Dept: PHYSICAL THERAPY | Facility: CLINIC | Age: 70
End: 2025-03-31
Payer: COMMERCIAL

## 2025-03-31 DIAGNOSIS — M19.011 PRIMARY OSTEOARTHRITIS, RIGHT SHOULDER: ICD-10-CM

## 2025-03-31 NOTE — PROGRESS NOTES
Physical Therapy    Physical Therapy Treatment    Patient Name: Galo Sanchez  MRN: [id number]  Encounter Date: 3/31/2025     Time Calculation  Start Time: 1102  Stop Time: 1142  Time Calculation (min): 40 min    Visit #: 4    Insurance: [employer] - NO AUTH / 100% COVERAGE thru 12/31/25 / $50 COPAY waived thru 12/31/25 / $3500 OOP MET / 20V/YR - 0 USED / REF# [id number] / ds 3/11/25.   Evaluation date: 03/13/25      Current Problem:   1. Primary osteoarthritis, right shoulder  Follow Up In Physical Therapy          SUBJECTIVE:   Pt reports discomfort when moving the LUE into flexion and abduction, otherwise reports no pain.     Precautions: NO SHOULDER IR, ADDUCTION, EXTENSION OR CROSS BODY MOVEMENT         Pain:   Start of session: 0/10       OBJECTIVE:    Good tolerance to exercise     Treatments:  Therapeutic Exercise: (30 minutes)  Table ball roll into flexion x 2 min  Supine wand press x 50  Supine wand flexion x 50  Seated pulleys x 4 min  Standing isometrics (5 sec increments x 20 ea):  -Wall punch  -External rotation  -Abduction    Standing bicep curl vs 2# x 30    Manual Therapy: ( 10 minutes)  Grade I shoulder oscillations   PROM:   -flexion  -External rotation          HEP:  See above     ASSESSMENT:   Pt tolerated all conservative strengthening and manual therapy well this date, no reports of pain throughout entire treatment session. Range of motion and strength are within expected range at this stage of protocol. Minor fatigue reported after completing strengthening exercises. Proceeds to show good activity tolerance throughout entire session.      Post session pain: 0/10, only fatigue.     PLAN:  Cont to progress as able     Goals:   Progressing

## 2025-04-02 DIAGNOSIS — E78.00 PURE HYPERCHOLESTEROLEMIA: ICD-10-CM

## 2025-04-02 RX ORDER — EVOLOCUMAB 140 MG/ML
INJECTION, SOLUTION SUBCUTANEOUS
Qty: 2 ML | Refills: 11 | Status: SHIPPED | OUTPATIENT
Start: 2025-04-02

## 2025-04-03 ENCOUNTER — APPOINTMENT (OUTPATIENT)
Dept: PHYSICAL THERAPY | Facility: CLINIC | Age: 70
End: 2025-04-03
Payer: COMMERCIAL

## 2025-04-07 ENCOUNTER — TREATMENT (OUTPATIENT)
Dept: PHYSICAL THERAPY | Facility: CLINIC | Age: 70
End: 2025-04-07
Payer: COMMERCIAL

## 2025-04-07 DIAGNOSIS — M19.011 PRIMARY OSTEOARTHRITIS, RIGHT SHOULDER: ICD-10-CM

## 2025-04-07 PROCEDURE — 97110 THERAPEUTIC EXERCISES: CPT | Mod: GP,CQ

## 2025-04-07 NOTE — PROGRESS NOTES
Physical Therapy    Physical Therapy Treatment    Patient Name: Galo Sanchez  MRN: [id number]  Encounter Date: 4/7/2025   DOS : 2/20/25  Time Calculation  Start Time: 1015  Stop Time: 1100  Time Calculation (min): 45 min    Visit #: 5    Insurance: [employer] - NO AUTH / 100% COVERAGE thru 12/31/25 / $50 COPAY waived thru 12/31/25 / $3500 OOP MET / 20V/YR - 0 USED / REF# [id number] / ds 3/11/25.   Evaluation date: 03/13/25      Current Problem:   1. Primary osteoarthritis, right shoulder  Follow Up In Physical Therapy          SUBJECTIVE:   Pt states that he feels good after last treatment, also notices that he has increased ROM compared to previous treatment sessions. Describes the feeling of normality in R shoulder. States that moving the joint makes it feel better during treatment session.     Precautions: NO SHOULDER IR, ADDUCTION, EXTENSION OR CROSS BODY MOVEMENT         Pain:   Start of session: 0/10       OBJECTIVE:    Good tolerance to exercise     Treatments:  Therapeutic Exercise: (40 minutes)  UBE x 6 min (3 min fwd and reverse)  Lawn chair progression:   - 45 deg incline wand press x 20  - 45 deg incline wand flexion x 20   - 45 deg incline R SA press x 20  - 45 deg incline R SA flexion x 20  - Supine R SA press vs 2# x 20  - Supine R SA flexion vs 2# x 20   - Supine R SA flexion full range x 20  20 deg incline DA iso ER x 3sec combo w/ press x 10  45 deg incline DA iso ER x 3sec combo w/ press x 10  Supine- rhythmic stab at ~90 2x 1min  Standing DA flexion at wall ball AAROM x 25   Standing SA to ball at wall , mini sup/inf and cw/ccw x 1 min ea    Manual Therapy: ( )       HEP:  See above     ASSESSMENT:   Pt demonstrates increased strength and ROM in R shoulder, reported no pain or discomfort with lawn chair progression this date. Pt also demonstrates good activity tolerance, little signs of fatigue represented in this treatment session. Attempted resistance w/ SA press and flexion which Pt tolerated very  well, also no reports of discomfort or pain throughout intervention. Overall tolerated treatment very well this date, no reports of pain or discomfort post session.      Post session pain: 0/10, only fatigue.     PLAN:  Cont to progress as able     Goals:   Progressing

## 2025-04-10 ENCOUNTER — APPOINTMENT (OUTPATIENT)
Dept: PHYSICAL THERAPY | Facility: CLINIC | Age: 70
End: 2025-04-10
Payer: COMMERCIAL

## 2025-04-16 ENCOUNTER — TREATMENT (OUTPATIENT)
Dept: PHYSICAL THERAPY | Facility: CLINIC | Age: 70
End: 2025-04-16
Payer: COMMERCIAL

## 2025-04-16 DIAGNOSIS — M19.011 PRIMARY OSTEOARTHRITIS, RIGHT SHOULDER: ICD-10-CM

## 2025-04-16 PROCEDURE — 97110 THERAPEUTIC EXERCISES: CPT | Mod: GP

## 2025-04-17 NOTE — PROGRESS NOTES
Physical Therapy    Physical Therapy Treatment    Patient Name: Galo Sanchez  MRN: [id number]  Encounter Date: 4/16/2025   DOS : 2/20/25  Time Calculation  Start Time: 1300  Stop Time: 1340  Time Calculation (min): 40 min    Visit #: 6    Insurance: [employer] - NO AUTH / 100% COVERAGE thru 12/31/25 / $50 COPAY waived thru 12/31/25 / $3500 OOP MET / 20V/YR - 0 USED / REF# [id number] / ds 3/11/25.   Evaluation date: 03/13/25      Current Problem:   1. Primary osteoarthritis, right shoulder  Follow Up In Physical Therapy          SUBJECTIVE:   Pt states that he feels good after last treatment, also notices that he has increased ROM compared to previous treatment sessions. Describes the feeling of normality in R shoulder. States that moving the joint makes it feel better during treatment session.     Precautions: NO SHOULDER IR, ADDUCTION, EXTENSION OR CROSS BODY MOVEMENT         Pain:   Start of session: 0/10       OBJECTIVE:    Good tolerance to exercise     Treatments:  Therapeutic Exercise: (40 minutes)  UBE x 6 min (3 min fwd and reverse)  Dowel sarita flexion standing  Dowel sartia press  Towel slide 2# ankle weight     At weight stack 12#  ER  IR  24#  Chest press   Row  Tricep push down    DB 4#  Curls  Lateral raises  Front raises     Sidelying 3#  ER  ABD    Manual Therapy: ( )       HEP:  See above     ASSESSMENT:   Emphasis of todays treatment was to focus on general strengthening, pt tolerated session well. AROM coming along great, we will cont to focus on strength phase at this point. Pt will cont to progress with skilled PT to continue to address above impairments       Post session pain: 0/10, only fatigue.     PLAN:  Cont to progress as able     Goals:   Progressing

## 2025-04-23 ENCOUNTER — APPOINTMENT (OUTPATIENT)
Dept: CARDIOLOGY | Facility: CLINIC | Age: 70
End: 2025-04-23
Payer: COMMERCIAL

## 2025-04-28 ENCOUNTER — TREATMENT (OUTPATIENT)
Dept: PHYSICAL THERAPY | Facility: CLINIC | Age: 70
End: 2025-04-28
Payer: COMMERCIAL

## 2025-04-28 DIAGNOSIS — M19.011 PRIMARY OSTEOARTHRITIS, RIGHT SHOULDER: ICD-10-CM

## 2025-04-28 PROCEDURE — 97110 THERAPEUTIC EXERCISES: CPT | Mod: GP

## 2025-04-29 NOTE — PROGRESS NOTES
Physical Therapy    Physical Therapy Treatment    Patient Name: Galo Sanchez  MRN: [id number]  Encounter Date: 4/28/2025   DOS : 2/20/25  Time Calculation  Start Time: 1015  Stop Time: 1055  Time Calculation (min): 40 min    Visit #: 7    Insurance: [employer] - NO AUTH / 100% COVERAGE thru 12/31/25 / $50 COPAY waived thru 12/31/25 / $3500 OOP MET / 20V/YR - 0 USED / REF# [id number] / ds 3/11/25.   Evaluation date: 03/13/25      Current Problem:   1. Primary osteoarthritis, right shoulder  Follow Up In Physical Therapy          SUBJECTIVE:   HEP going well, noticing strength come back nicely in R shoulder   Precautions: NO SHOULDER IR, ADDUCTION, EXTENSION OR CROSS BODY MOVEMENT         Pain:   Start of session: 0/10       OBJECTIVE:    Good tolerance to exercise     Treatments:  Therapeutic Exercise: (40 minutes)  UBE x 6 min (3 min fwd and reverse)  Dowel sarita flexion standing  Dowel sarita press  Towel slide 3# ankle weight     At weight stack 12#  ER  IR  24#  Chest press   Row  Tricep push down    DB 4#  Curls  Lateral raises  Front raises     Sidelying 3#  ER  ABD    Manual Therapy: ( )       HEP:  See above     ASSESSMENT:   Emphasis of todays treatment was to focus on general strengthening, pt tolerated session well. AROM coming along great, we will cont to focus on strength phase at this point. Pt will cont to progress with skilled PT to continue to address above impairments       Post session pain: 0/10, only fatigue.     PLAN:  Cont to progress as able     Goals:   Progressing

## 2025-05-12 ENCOUNTER — TREATMENT (OUTPATIENT)
Dept: PHYSICAL THERAPY | Facility: CLINIC | Age: 70
End: 2025-05-12
Payer: COMMERCIAL

## 2025-05-12 DIAGNOSIS — M19.011 PRIMARY OSTEOARTHRITIS, RIGHT SHOULDER: ICD-10-CM

## 2025-05-12 PROCEDURE — 97110 THERAPEUTIC EXERCISES: CPT | Mod: GP

## 2025-05-12 NOTE — PROGRESS NOTES
Physical Therapy    Physical Therapy Treatment    Patient Name: Galo Sanchez  MRN: [id number]  Encounter Date: 5/12/2025   DOS : 2/20/25  Time Calculation  Start Time: 1315  Stop Time: 1335  Time Calculation (min): 20 min    Visit #: 8    Insurance: [employer] - NO AUTH / 100% COVERAGE thru 12/31/25 / $50 COPAY waived thru 12/31/25 / $3500 OOP MET / 20V/YR - 0 USED / REF# [id number] / ds 3/11/25.   Evaluation date: 03/13/25      Current Problem:   1. Primary osteoarthritis, right shoulder  Follow Up In Physical Therapy          SUBJECTIVE:   HEP going well, noticing strength come back nicely in R shoulder   Precautions: NO SHOULDER IR, ADDUCTION, EXTENSION OR CROSS BODY MOVEMENT         Pain:   Start of session: 0/10       OBJECTIVE:    ROM:   Flexion: 150+  Abd: 130+  ER: 60+  Functional IR: Slightly above PSIS     Strength:  RUE: 4+/5  LUE: 4+/5     Palpation: WFL    Treatments:  Therapeutic Exercise: (40 minutes)  UBE x 6 min (3 min fwd and reverse)  Reassessment  Review HEP     HEP:  See above     ASSESSMENT:   Pt presents to the clinic today for a formal reassessment. Pt is demonstrating significant improvement with respect to strength, AROM, and tolerance to activity. Pt has no pain or functional limitations and this time and has successfully met all of her therapeutic goals. Pt will be D.C from PT at this time.        Post session pain: 0/10, only fatigue.     PLAN:  Cont to progress as able     Goals:   Progressing

## 2025-08-02 NOTE — ASSESSMENT & PLAN NOTE
"Former patient of Dr. Rebolledo with a history of atherosclerotic heart disease status post remote percutaneous coronary intervention.  Coronary intervention sounds like a drug-eluting stent to the proximal LAD as he was told he had a \" maker\".  Dr. Roberts performed the intervention the patient transferred his cardiology care to Dr. Rebolledo.  Currently experiences no chest pain or anginal type symptoms.  We will continue standard risk factor modification and follow on a clinical basis.  He did have an exercise stress test in October 2023 revealing no evidence of ischemia and good functional capacity with 10 METS of workload achieved.  The patient is requesting annual visits which I think would be reasonable giving his stable status.  "

## 2025-08-04 ENCOUNTER — OFFICE VISIT (OUTPATIENT)
Facility: CLINIC | Age: 70
End: 2025-08-04
Payer: COMMERCIAL

## 2025-08-04 VITALS
DIASTOLIC BLOOD PRESSURE: 70 MMHG | OXYGEN SATURATION: 94 % | SYSTOLIC BLOOD PRESSURE: 116 MMHG | HEART RATE: 65 BPM | HEIGHT: 69 IN | BODY MASS INDEX: 32.58 KG/M2 | WEIGHT: 220 LBS

## 2025-08-04 DIAGNOSIS — E78.2 MIXED HYPERLIPIDEMIA: ICD-10-CM

## 2025-08-04 DIAGNOSIS — I25.10 ARTERIOSCLEROTIC HEART DISEASE: Primary | ICD-10-CM

## 2025-08-04 DIAGNOSIS — I10 PRIMARY HYPERTENSION: ICD-10-CM

## 2025-08-04 PROCEDURE — 3074F SYST BP LT 130 MM HG: CPT | Performed by: INTERNAL MEDICINE

## 2025-08-04 PROCEDURE — 1126F AMNT PAIN NOTED NONE PRSNT: CPT | Performed by: INTERNAL MEDICINE

## 2025-08-04 PROCEDURE — 3078F DIAST BP <80 MM HG: CPT | Performed by: INTERNAL MEDICINE

## 2025-08-04 PROCEDURE — 3008F BODY MASS INDEX DOCD: CPT | Performed by: INTERNAL MEDICINE

## 2025-08-04 PROCEDURE — 99214 OFFICE O/P EST MOD 30 MIN: CPT | Performed by: INTERNAL MEDICINE

## 2025-08-04 PROCEDURE — 1159F MED LIST DOCD IN RCRD: CPT | Performed by: INTERNAL MEDICINE

## 2025-08-04 PROCEDURE — 99212 OFFICE O/P EST SF 10 MIN: CPT

## 2025-08-04 ASSESSMENT — ENCOUNTER SYMPTOMS
PARESTHESIAS: 0
OCCASIONAL FEELINGS OF UNSTEADINESS: 0
BACK PAIN: 1
DYSPNEA ON EXERTION: 0
DYSURIA: 0
HEMATURIA: 0
PALPITATIONS: 0
LOSS OF SENSATION IN FEET: 0
SHORTNESS OF BREATH: 0
BLURRED VISION: 0
NUMBNESS: 0
ABDOMINAL PAIN: 0
DEPRESSION: 0
COUGH: 0

## 2025-08-04 ASSESSMENT — LIFESTYLE VARIABLES
SKIP TO QUESTIONS 9-10: 1
HAVE YOU OR SOMEONE ELSE BEEN INJURED AS A RESULT OF YOUR DRINKING: NO
HAS A RELATIVE, FRIEND, DOCTOR, OR ANOTHER HEALTH PROFESSIONAL EXPRESSED CONCERN ABOUT YOUR DRINKING OR SUGGESTED YOU CUT DOWN: NO
AUDIT-C TOTAL SCORE: 0
HOW MANY STANDARD DRINKS CONTAINING ALCOHOL DO YOU HAVE ON A TYPICAL DAY: PATIENT DOES NOT DRINK
HOW OFTEN DO YOU HAVE SIX OR MORE DRINKS ON ONE OCCASION: NEVER
AUDIT TOTAL SCORE: 0
HOW OFTEN DO YOU HAVE A DRINK CONTAINING ALCOHOL: NEVER

## 2025-08-04 ASSESSMENT — PATIENT HEALTH QUESTIONNAIRE - PHQ9
2. FEELING DOWN, DEPRESSED OR HOPELESS: NOT AT ALL
1. LITTLE INTEREST OR PLEASURE IN DOING THINGS: NOT AT ALL
SUM OF ALL RESPONSES TO PHQ9 QUESTIONS 1 AND 2: 0

## 2025-08-04 ASSESSMENT — PAIN SCALES - GENERAL: PAINLEVEL_OUTOF10: 0-NO PAIN

## 2025-08-04 NOTE — PROGRESS NOTES
Subjective   Galo Sanchez is a 70 y.o. male.    Chief Complaint:  Follow-up (6 Month )    HPI  70-year-old male, former patient of Dr. Rebolledo, with a history of coronary artery disease reports for cardiology follow-up visit.  The patient underwent a coronary intervention back in May 2011 with likely a drug-eluting stent placed into the left anterior sending artery as he states he was told he had a  maker.  The procedure was done by Dr. Roberts.  He subsequently has followed with Dr. Rebolledo and has been doing well.  Describes no chest pain or anginal symptoms and remains physically active.  He had an exercise stress test in October 2023 revealing no evidence of ischemia and a good workload with 10 METS achieved.    Review of Systems   Constitutional: Negative for malaise/fatigue.   HENT:  Negative for congestion.    Eyes:  Negative for blurred vision.   Cardiovascular:  Negative for chest pain, dyspnea on exertion and palpitations.   Respiratory:  Negative for cough and shortness of breath.    Musculoskeletal:  Positive for arthritis, back pain and joint pain.   Gastrointestinal:  Negative for abdominal pain.   Genitourinary:  Negative for dysuria and hematuria.   Neurological:  Negative for numbness and paresthesias.       Objective   Constitutional:       Appearance: Not in distress.   Eyes:      Conjunctiva/sclera: Conjunctivae normal.   Neck:      Vascular: JVD normal.   Pulmonary:      Breath sounds: Normal breath sounds. No wheezing. No rhonchi. No rales.   Cardiovascular:      Normal rate. Regular rhythm.      Murmurs: There is no murmur.      No gallop.  No click. No rub.   Abdominal:      Palpations: Abdomen is soft.   Neurological:      General: No focal deficit present.      Mental Status: Alert.       Lab Review:       Assessment/Plan   The primary encounter diagnosis was Arteriosclerotic heart disease. Diagnoses of Mixed hyperlipidemia and Primary hypertension were also pertinent to this  "visit.    Arteriosclerotic heart disease  Former patient of Dr. Rebolledo with a history of atherosclerotic heart disease status post remote percutaneous coronary intervention.  Coronary intervention sounds like a drug-eluting stent to the proximal LAD as he was told he had a \" maker\".  Dr. Roberts performed the intervention the patient transferred his cardiology care to Dr. Rebolledo.  Currently experiences no chest pain or anginal type symptoms.  We will continue standard risk factor modification and follow on a clinical basis.  He did have an exercise stress test in October 2023 revealing no evidence of ischemia and good functional capacity with 10 METS of workload achieved.  The patient is requesting annual visits which I think would be reasonable giving his stable status.    Hyperlipidemia  The patient has been statin intolerant and utilizes Repatha.  He has not had a fasting lipid panel done for couple years and I will provide him with a requisition so it can be checked.    HTN (hypertension)  Blood pressure very well-controlled on low-dose losartan therapy at 25 mg daily.  No change necessary.   "

## 2025-08-04 NOTE — ASSESSMENT & PLAN NOTE
The patient has been statin intolerant and utilizes Repatha.  He has not had a fasting lipid panel done for couple years and I will provide him with a requisition so it can be checked.

## 2025-08-04 NOTE — ASSESSMENT & PLAN NOTE
Blood pressure very well-controlled on low-dose losartan therapy at 25 mg daily.  No change necessary.

## (undated) DEVICE — SOLUTION, IRRIGATION, X RX SODIUM CHL 0.9%, 1000ML BTL

## (undated) DEVICE — Device

## (undated) DEVICE — BLANKET, LOWER BODY, VHA PLUS, ADULT

## (undated) DEVICE — NEEDLE, ELECTRODE, ELECTROSURGICAL, INSULATED

## (undated) DEVICE — DRAPE, INSTRUMENT, W/POUCH, STERI DRAPE, 7 X 11 IN, DISPOSABLE, STERILE

## (undated) DEVICE — COVER, BACK TABLE, 65 X 90, HVY REINFORCED

## (undated) DEVICE — HOOD, SURGICAL, FLYTE HYBRID

## (undated) DEVICE — SUTURE, V-LOC, 3-0, 18IN, P-12

## (undated) DEVICE — CLOSURE SYSTEM, DERMABOND, PRINEO, 22CM, STERILE

## (undated) DEVICE — SUTURE, VICRYL, 0, 36 IN, CT-1, UNDYED

## (undated) DEVICE — GLOVE, SURGICAL, BIOGEL, OPTIFIT, SZ 8.0

## (undated) DEVICE — WOUND SYSTEM, DEBRIDEMENT & CLEANING, O.R DUOPAK

## (undated) DEVICE — DRESSING, MEPILEX BORDER, POST-OP AG, 4 X 10 IN

## (undated) DEVICE — TUBING, SUCTION, 6MM X 10, CLEAN N-COND

## (undated) DEVICE — SPONGE, LAP, XRAY DECT, 18IN X 18IN, W/MASTER DMT, STERILE

## (undated) DEVICE — SUTURE, FIBERWIRE 2, T-5 TAPER NEEDLE, 38"

## (undated) DEVICE — SCREW DRILL, PERIPHERAL, 2.7MM

## (undated) DEVICE — MASK, FACE, TENET, FOAM POSITIONING, DISPOSABLE

## (undated) DEVICE — SUTURE, CTD, VICRYL, 2-0, UND, BR, CT-2

## (undated) DEVICE — BLADE, SAW, SAGITTAL, 25.0 X 1.27 X 90MM